# Patient Record
Sex: MALE | Race: WHITE | NOT HISPANIC OR LATINO | URBAN - METROPOLITAN AREA
[De-identification: names, ages, dates, MRNs, and addresses within clinical notes are randomized per-mention and may not be internally consistent; named-entity substitution may affect disease eponyms.]

---

## 2017-01-04 ENCOUNTER — EMERGENCY (EMERGENCY)
Facility: HOSPITAL | Age: 82
LOS: 1 days | Discharge: HOME | End: 2017-01-04
Attending: EMERGENCY MEDICINE | Admitting: EMERGENCY MEDICINE
Payer: MEDICARE

## 2017-01-04 VITALS
OXYGEN SATURATION: 97 % | DIASTOLIC BLOOD PRESSURE: 84 MMHG | SYSTOLIC BLOOD PRESSURE: 133 MMHG | RESPIRATION RATE: 16 BRPM | HEART RATE: 74 BPM

## 2017-01-04 VITALS
RESPIRATION RATE: 18 BRPM | TEMPERATURE: 98 F | HEART RATE: 75 BPM | SYSTOLIC BLOOD PRESSURE: 162 MMHG | DIASTOLIC BLOOD PRESSURE: 84 MMHG | OXYGEN SATURATION: 97 %

## 2017-01-04 DIAGNOSIS — I10 ESSENTIAL (PRIMARY) HYPERTENSION: ICD-10-CM

## 2017-01-04 DIAGNOSIS — M79.641 PAIN IN RIGHT HAND: ICD-10-CM

## 2017-01-04 DIAGNOSIS — R53.83 OTHER FATIGUE: ICD-10-CM

## 2017-01-04 DIAGNOSIS — Z90.49 ACQUIRED ABSENCE OF OTHER SPECIFIED PARTS OF DIGESTIVE TRACT: ICD-10-CM

## 2017-01-04 DIAGNOSIS — M79.1 MYALGIA: ICD-10-CM

## 2017-01-04 DIAGNOSIS — Z90.79 ACQUIRED ABSENCE OF OTHER GENITAL ORGAN(S): Chronic | ICD-10-CM

## 2017-01-04 DIAGNOSIS — N39.0 URINARY TRACT INFECTION, SITE NOT SPECIFIED: ICD-10-CM

## 2017-01-04 DIAGNOSIS — Z90.49 ACQUIRED ABSENCE OF OTHER SPECIFIED PARTS OF DIGESTIVE TRACT: Chronic | ICD-10-CM

## 2017-01-04 LAB
ALBUMIN SERPL ELPH-MCNC: 3.7 G/DL — SIGNIFICANT CHANGE UP (ref 3.3–5)
ALP SERPL-CCNC: 99 U/L — SIGNIFICANT CHANGE UP (ref 40–120)
ALT FLD-CCNC: 29 U/L RC — SIGNIFICANT CHANGE UP (ref 10–45)
ANION GAP SERPL CALC-SCNC: 13 MMOL/L — SIGNIFICANT CHANGE UP (ref 5–17)
APPEARANCE UR: ABNORMAL
AST SERPL-CCNC: 27 U/L — SIGNIFICANT CHANGE UP (ref 10–40)
BASOPHILS # BLD AUTO: 0 K/UL — SIGNIFICANT CHANGE UP (ref 0–0.2)
BASOPHILS NFR BLD AUTO: 0.1 % — SIGNIFICANT CHANGE UP (ref 0–2)
BILIRUB SERPL-MCNC: 0.5 MG/DL — SIGNIFICANT CHANGE UP (ref 0.2–1.2)
BILIRUB UR-MCNC: NEGATIVE — SIGNIFICANT CHANGE UP
BUN SERPL-MCNC: 12 MG/DL — SIGNIFICANT CHANGE UP (ref 7–23)
CALCIUM SERPL-MCNC: 9.4 MG/DL — SIGNIFICANT CHANGE UP (ref 8.4–10.5)
CHLORIDE SERPL-SCNC: 100 MMOL/L — SIGNIFICANT CHANGE UP (ref 96–108)
CO2 SERPL-SCNC: 33 MMOL/L — HIGH (ref 22–31)
COLOR SPEC: SIGNIFICANT CHANGE UP
CREAT SERPL-MCNC: 0.89 MG/DL — SIGNIFICANT CHANGE UP (ref 0.5–1.3)
DIFF PNL FLD: ABNORMAL
EOSINOPHIL # BLD AUTO: 0.1 K/UL — SIGNIFICANT CHANGE UP (ref 0–0.5)
EOSINOPHIL NFR BLD AUTO: 0.9 % — SIGNIFICANT CHANGE UP (ref 0–6)
GLUCOSE SERPL-MCNC: 139 MG/DL — HIGH (ref 70–99)
GLUCOSE UR QL: NEGATIVE — SIGNIFICANT CHANGE UP
HCT VFR BLD CALC: 40.9 % — SIGNIFICANT CHANGE UP (ref 39–50)
HGB BLD-MCNC: 13.2 G/DL — SIGNIFICANT CHANGE UP (ref 13–17)
KETONES UR-MCNC: NEGATIVE — SIGNIFICANT CHANGE UP
LEUKOCYTE ESTERASE UR-ACNC: ABNORMAL
LYMPHOCYTES # BLD AUTO: 1.3 K/UL — SIGNIFICANT CHANGE UP (ref 1–3.3)
LYMPHOCYTES # BLD AUTO: 9.7 % — LOW (ref 13–44)
MCHC RBC-ENTMCNC: 29.6 PG — SIGNIFICANT CHANGE UP (ref 27–34)
MCHC RBC-ENTMCNC: 32.2 GM/DL — SIGNIFICANT CHANGE UP (ref 32–36)
MCV RBC AUTO: 92.1 FL — SIGNIFICANT CHANGE UP (ref 80–100)
MONOCYTES # BLD AUTO: 0.6 K/UL — SIGNIFICANT CHANGE UP (ref 0–0.9)
MONOCYTES NFR BLD AUTO: 4.8 % — SIGNIFICANT CHANGE UP (ref 2–14)
NEUTROPHILS # BLD AUTO: 11.2 K/UL — HIGH (ref 1.8–7.4)
NEUTROPHILS NFR BLD AUTO: 84.6 % — HIGH (ref 43–77)
NITRITE UR-MCNC: NEGATIVE — SIGNIFICANT CHANGE UP
PH UR: 8 — SIGNIFICANT CHANGE UP (ref 4.8–8)
PLATELET # BLD AUTO: 366 K/UL — SIGNIFICANT CHANGE UP (ref 150–400)
POTASSIUM SERPL-MCNC: 4 MMOL/L — SIGNIFICANT CHANGE UP (ref 3.5–5.3)
POTASSIUM SERPL-SCNC: 4 MMOL/L — SIGNIFICANT CHANGE UP (ref 3.5–5.3)
PROT SERPL-MCNC: 7.5 G/DL — SIGNIFICANT CHANGE UP (ref 6–8.3)
PROT UR-MCNC: 30 MG/DL
RBC # BLD: 4.44 M/UL — SIGNIFICANT CHANGE UP (ref 4.2–5.8)
RBC # FLD: 13.4 % — SIGNIFICANT CHANGE UP (ref 10.3–14.5)
SODIUM SERPL-SCNC: 146 MMOL/L — HIGH (ref 135–145)
SP GR SPEC: 1.01 — SIGNIFICANT CHANGE UP (ref 1.01–1.02)
UROBILINOGEN FLD QL: NEGATIVE — SIGNIFICANT CHANGE UP
WBC # BLD: 13.2 K/UL — HIGH (ref 3.8–10.5)
WBC # FLD AUTO: 13.2 K/UL — HIGH (ref 3.8–10.5)
WBC UR QL: >50 /HPF (ref 0–5)

## 2017-01-04 PROCEDURE — 70450 CT HEAD/BRAIN W/O DYE: CPT | Mod: 26

## 2017-01-04 PROCEDURE — 87086 URINE CULTURE/COLONY COUNT: CPT

## 2017-01-04 PROCEDURE — 80053 COMPREHEN METABOLIC PANEL: CPT

## 2017-01-04 PROCEDURE — 99284 EMERGENCY DEPT VISIT MOD MDM: CPT | Mod: GC

## 2017-01-04 PROCEDURE — 82962 GLUCOSE BLOOD TEST: CPT

## 2017-01-04 PROCEDURE — 99284 EMERGENCY DEPT VISIT MOD MDM: CPT | Mod: 25

## 2017-01-04 PROCEDURE — 96372 THER/PROPH/DIAG INJ SC/IM: CPT

## 2017-01-04 PROCEDURE — 70450 CT HEAD/BRAIN W/O DYE: CPT

## 2017-01-04 PROCEDURE — 81001 URINALYSIS AUTO W/SCOPE: CPT

## 2017-01-04 PROCEDURE — 85027 COMPLETE CBC AUTOMATED: CPT

## 2017-01-04 PROCEDURE — 73130 X-RAY EXAM OF HAND: CPT | Mod: 26,50

## 2017-01-04 PROCEDURE — 87186 SC STD MICRODIL/AGAR DIL: CPT

## 2017-01-04 PROCEDURE — 73130 X-RAY EXAM OF HAND: CPT

## 2017-01-04 RX ORDER — SODIUM CHLORIDE 9 MG/ML
1000 INJECTION INTRAMUSCULAR; INTRAVENOUS; SUBCUTANEOUS
Qty: 0 | Refills: 0 | Status: DISCONTINUED | OUTPATIENT
Start: 2017-01-04 | End: 2017-01-08

## 2017-01-04 RX ORDER — OLANZAPINE 15 MG/1
7.5 TABLET, FILM COATED ORAL ONCE
Qty: 0 | Refills: 0 | Status: COMPLETED | OUTPATIENT
Start: 2017-01-04 | End: 2017-01-04

## 2017-01-04 RX ORDER — AZTREONAM 2 G
1 VIAL (EA) INJECTION
Qty: 20 | Refills: 0 | OUTPATIENT
Start: 2017-01-04 | End: 2017-01-14

## 2017-01-04 RX ORDER — OLANZAPINE 15 MG/1
5 TABLET, FILM COATED ORAL ONCE
Qty: 0 | Refills: 0 | Status: DISCONTINUED | OUTPATIENT
Start: 2017-01-04 | End: 2017-01-04

## 2017-01-04 RX ORDER — SODIUM CHLORIDE 9 MG/ML
1000 INJECTION INTRAMUSCULAR; INTRAVENOUS; SUBCUTANEOUS ONCE
Qty: 0 | Refills: 0 | Status: COMPLETED | OUTPATIENT
Start: 2017-01-04 | End: 2017-01-04

## 2017-01-04 RX ORDER — CEFTRIAXONE 500 MG/1
1 INJECTION, POWDER, FOR SOLUTION INTRAMUSCULAR; INTRAVENOUS ONCE
Qty: 0 | Refills: 0 | Status: DISCONTINUED | OUTPATIENT
Start: 2017-01-04 | End: 2017-01-04

## 2017-01-04 RX ADMIN — OLANZAPINE 7.5 MILLIGRAM(S): 15 TABLET, FILM COATED ORAL at 17:26

## 2017-01-04 RX ADMIN — SODIUM CHLORIDE 1000 MILLILITER(S): 9 INJECTION INTRAMUSCULAR; INTRAVENOUS; SUBCUTANEOUS at 17:24

## 2017-01-04 RX ADMIN — SODIUM CHLORIDE 75 MILLILITER(S): 9 INJECTION INTRAMUSCULAR; INTRAVENOUS; SUBCUTANEOUS at 20:21

## 2017-01-04 RX ADMIN — Medication 1 TABLET(S): at 20:20

## 2017-01-04 NOTE — ED PROVIDER NOTE - SHIFT CHANGE DETAILS
Will follow up on labs, analgesia, any clinical imaging, reassess and disposition as clinically indicated.  Details of patient and plan conveyed to receiving physician and conveyed back for understanding.  There were no questions at this time about the patients disposition and plan. Patient's care to be taken over by receiving physician at this time, all decisions regarding the progression of care will be made at their discretion.

## 2017-01-04 NOTE — ED PROVIDER NOTE - MEDICAL DECISION MAKING DETAILS
86 year old male patient with hand swelling and urinary obstruction concerned from family and [facility] nursing home. Will remove Barr catheter and insert new Barr. Will give slight fluids, CT head. Per son, might have to give 7.5mg Zyprexa to x-ray bilateral hands.

## 2017-01-04 NOTE — ED PROVIDER NOTE - DETAILS:
Cruz Houser MD note: The scribe's documentation has been prepared under my direction and personally reviewed by me.  I confirm that the note above accurately reflects my work, treatment, procedures, and medical decision making.

## 2017-01-04 NOTE — ED PROVIDER NOTE - CONDUCTED A DETAILED DISCUSSION WITH PATIENT AND/OR GUARDIAN REGARDING, MDM
return to ED if symptoms worsen, persist or questions arise/radiology results/lab results/need for outpatient follow-up

## 2017-01-04 NOTE — ED ADULT NURSE NOTE - OBJECTIVE STATEMENT
1210 86 yr old WM brought to ER via ambulance on stretcher for further eval and tx of Leaking carlson. Bag was changed yesterday per EMS. Bed was saturated with urine per home health aide this morning, but no urine in drainage bag. awake. Eyes opened. confused. non verbal 1210 86 yr old WM brought to ER via ambulance on stretcher for further eval and tx of Leaking carlson. Bag was changed yesterday per EMS. Bed was saturated with urine per home health aide this morning, but no urine in drainage bag. awake. Eyes opened. confused. non verbal. wearing diaper. Carlson noted with no drainage. Deflated without difficulty and removed

## 2017-01-04 NOTE — ED PROVIDER NOTE - OBJECTIVE STATEMENT
86 year old male patient with pmhx of HTN, PAF, dementia, and gout presents to ED for pulled Barr catheter and increased lethargy as per phone call from son, Dr. Sotelo. Son would like Barr replaced and to x-ray both hands due to concern for bilateral swelling of hands. If necessary, will give patient 5-7.5mg of Zyprexa. 86 year old male patient with pmhx of HTN, PAF, dementia, and gout presents to ED for pulled Barr catheter/malfunction at this time and increased lethargy as per phone call from son, Dr. Sotelo. Son would like Barr replaced and to x-ray both hands due to concern for bilateral swelling of hands. If necessary, will give patient 5-7.5mg of Zyprexa for study per son.

## 2017-01-04 NOTE — ED ADULT TRIAGE NOTE - CHIEF COMPLAINT QUOTE
carlson is not working correctly.  carlson bag was changed yesterday and pt woke up this morning saturated with urine.  Pt complaining of b/l hand pain.  son requesting that hands be xrayed. no injury reported to hands

## 2017-01-04 NOTE — ED PROVIDER NOTE - PROGRESS NOTE DETAILS
Reassessment. Patient is now opening eyes and awake. Waiting for x-ray of hands. Spoke to Dr. Sotelo. Will give bactrim for urine. If culture is resistant to bactrim, patient will report back to ED for admission. BILLY: awaiting xray results.

## 2017-01-04 NOTE — ED PROVIDER NOTE - PMH
Afib    Alzheimer disease    BPH (benign prostatic hypertrophy) with urinary retention    Glaucoma    HTN (hypertension)

## 2017-01-04 NOTE — ED PROVIDER NOTE - CARE PLAN
Principal Discharge DX:	UTI (urinary tract infection) Principal Discharge DX:	UTI (urinary tract infection)  Goal:	hand swelling bilaterally, traumatic, subsequent encounter

## 2017-01-04 NOTE — ED PROVIDER NOTE - NS ED MD SCRIBE ATTENDING SCRIBE SECTIONS
VITAL SIGNS( Pullset)/HIV/DISPOSITION/HISTORY OF PRESENT ILLNESS/PHYSICAL EXAM/REVIEW OF SYSTEMS/PAST MEDICAL/SURGICAL/SOCIAL HISTORY

## 2017-01-04 NOTE — ED PROVIDER NOTE - PHYSICAL EXAMINATION
Eyes closed and will not open eyes to commands, Not answering questions, Dry and cracked lips and dry mucous membranes, Trachea midline, CTAB and no crackles, Non-tachycardic, Normal perfusion, soft with no elicited tenderness, NTND, No edema, No deformity of extremities, Not alert, Uncooperative with exam, Slightly combative, No rashes noted, No petechiae, No vesicles, Left hand with erythema over 2nd to 5th MCPs and mildly swollen, Right hand mildly swollen over 1st and 2nd MCP, Barr in place draining yellow urine with sediment.

## 2017-01-06 ENCOUNTER — APPOINTMENT (OUTPATIENT)
Dept: HOME HEALTH SERVICES | Facility: HOME HEALTH | Age: 82
End: 2017-01-06

## 2017-01-06 VITALS
SYSTOLIC BLOOD PRESSURE: 170 MMHG | DIASTOLIC BLOOD PRESSURE: 90 MMHG | HEART RATE: 72 BPM | OXYGEN SATURATION: 97 % | RESPIRATION RATE: 16 BRPM

## 2017-01-06 DIAGNOSIS — R19.7 DIARRHEA, UNSPECIFIED: ICD-10-CM

## 2017-01-06 DIAGNOSIS — A04.7 ENTEROCOLITIS DUE TO CLOSTRIDIUM DIFFICILE: ICD-10-CM

## 2017-01-06 LAB
-  AMIKACIN: SIGNIFICANT CHANGE UP
-  AMPICILLIN/SULBACTAM: SIGNIFICANT CHANGE UP
-  AMPICILLIN: SIGNIFICANT CHANGE UP
-  AZTREONAM: SIGNIFICANT CHANGE UP
-  CEFAZOLIN: SIGNIFICANT CHANGE UP
-  CEFEPIME: SIGNIFICANT CHANGE UP
-  CEFOXITIN: SIGNIFICANT CHANGE UP
-  CEFTAZIDIME: SIGNIFICANT CHANGE UP
-  CEFTRIAXONE: SIGNIFICANT CHANGE UP
-  CIPROFLOXACIN: SIGNIFICANT CHANGE UP
-  ERTAPENEM: SIGNIFICANT CHANGE UP
-  GENTAMICIN: SIGNIFICANT CHANGE UP
-  LEVOFLOXACIN: SIGNIFICANT CHANGE UP
-  MEROPENEM: SIGNIFICANT CHANGE UP
-  NITROFURANTOIN: SIGNIFICANT CHANGE UP
-  PIPERACILLIN/TAZOBACTAM: SIGNIFICANT CHANGE UP
-  TOBRAMYCIN: SIGNIFICANT CHANGE UP
-  TRIMETHOPRIM/SULFAMETHOXAZOLE: SIGNIFICANT CHANGE UP
CULTURE RESULTS: SIGNIFICANT CHANGE UP
METHOD TYPE: SIGNIFICANT CHANGE UP
ORGANISM # SPEC MICROSCOPIC CNT: SIGNIFICANT CHANGE UP
ORGANISM # SPEC MICROSCOPIC CNT: SIGNIFICANT CHANGE UP
SPECIMEN SOURCE: SIGNIFICANT CHANGE UP

## 2017-01-27 ENCOUNTER — APPOINTMENT (OUTPATIENT)
Dept: HOME HEALTH SERVICES | Facility: HOME HEALTH | Age: 82
End: 2017-01-27

## 2017-01-27 VITALS
SYSTOLIC BLOOD PRESSURE: 135 MMHG | HEART RATE: 60 BPM | DIASTOLIC BLOOD PRESSURE: 66 MMHG | TEMPERATURE: 97 F | RESPIRATION RATE: 16 BRPM | OXYGEN SATURATION: 96 %

## 2017-02-06 ENCOUNTER — APPOINTMENT (OUTPATIENT)
Dept: HOME HEALTH SERVICES | Facility: HOME HEALTH | Age: 82
End: 2017-02-06

## 2017-02-06 DIAGNOSIS — N39.0 URINARY TRACT INFECTION, SITE NOT SPECIFIED: ICD-10-CM

## 2017-02-07 VITALS — SYSTOLIC BLOOD PRESSURE: 120 MMHG | DIASTOLIC BLOOD PRESSURE: 80 MMHG | HEART RATE: 70 BPM | RESPIRATION RATE: 16 BRPM

## 2017-02-13 ENCOUNTER — CHART COPY (OUTPATIENT)
Age: 82
End: 2017-02-13

## 2017-02-17 ENCOUNTER — APPOINTMENT (OUTPATIENT)
Dept: HOME HEALTH SERVICES | Facility: HOME HEALTH | Age: 82
End: 2017-02-17

## 2017-02-22 ENCOUNTER — APPOINTMENT (OUTPATIENT)
Dept: HOME HEALTH SERVICES | Facility: HOME HEALTH | Age: 82
End: 2017-02-22

## 2017-02-22 VITALS
RESPIRATION RATE: 16 BRPM | HEART RATE: 88 BPM | TEMPERATURE: 98 F | SYSTOLIC BLOOD PRESSURE: 110 MMHG | DIASTOLIC BLOOD PRESSURE: 68 MMHG

## 2017-03-08 ENCOUNTER — APPOINTMENT (OUTPATIENT)
Dept: HOME HEALTH SERVICES | Facility: HOME HEALTH | Age: 82
End: 2017-03-08

## 2017-03-28 ENCOUNTER — APPOINTMENT (OUTPATIENT)
Dept: HOME HEALTH SERVICES | Facility: HOME HEALTH | Age: 82
End: 2017-03-28

## 2017-03-28 VITALS — HEART RATE: 80 BPM | RESPIRATION RATE: 16 BRPM | DIASTOLIC BLOOD PRESSURE: 70 MMHG | SYSTOLIC BLOOD PRESSURE: 120 MMHG

## 2017-03-28 DIAGNOSIS — N13.8 BENIGN PROSTATIC HYPERPLASIA WITH LOWER URINARY TRACT SYMPMS: ICD-10-CM

## 2017-03-28 DIAGNOSIS — N40.1 BENIGN PROSTATIC HYPERPLASIA WITH LOWER URINARY TRACT SYMPMS: ICD-10-CM

## 2017-04-16 ENCOUNTER — INPATIENT (INPATIENT)
Facility: HOSPITAL | Age: 82
LOS: 14 days | Discharge: HOME | DRG: 872 | End: 2017-05-01
Attending: HOSPITALIST | Admitting: HOSPITALIST
Payer: MEDICARE

## 2017-04-16 VITALS
DIASTOLIC BLOOD PRESSURE: 53 MMHG | TEMPERATURE: 103 F | SYSTOLIC BLOOD PRESSURE: 110 MMHG | RESPIRATION RATE: 18 BRPM | OXYGEN SATURATION: 99 % | HEART RATE: 104 BPM

## 2017-04-16 DIAGNOSIS — Z90.79 ACQUIRED ABSENCE OF OTHER GENITAL ORGAN(S): Chronic | ICD-10-CM

## 2017-04-16 DIAGNOSIS — Z92.89 PERSONAL HISTORY OF OTHER MEDICAL TREATMENT: ICD-10-CM

## 2017-04-16 DIAGNOSIS — A41.9 SEPSIS, UNSPECIFIED ORGANISM: ICD-10-CM

## 2017-04-16 DIAGNOSIS — Z90.49 ACQUIRED ABSENCE OF OTHER SPECIFIED PARTS OF DIGESTIVE TRACT: Chronic | ICD-10-CM

## 2017-04-16 LAB
ALBUMIN SERPL ELPH-MCNC: 3.1 G/DL — LOW (ref 3.3–5)
ALP SERPL-CCNC: 128 U/L — HIGH (ref 40–120)
ALT FLD-CCNC: 17 U/L RC — SIGNIFICANT CHANGE UP (ref 10–45)
ANION GAP SERPL CALC-SCNC: 11 MMOL/L — SIGNIFICANT CHANGE UP (ref 5–17)
APPEARANCE UR: ABNORMAL
APTT BLD: 23 SEC — LOW (ref 27.5–37.4)
AST SERPL-CCNC: 35 U/L — SIGNIFICANT CHANGE UP (ref 10–40)
BACTERIA # UR AUTO: ABNORMAL /HPF
BASOPHILS # BLD AUTO: 0 K/UL — SIGNIFICANT CHANGE UP (ref 0–0.2)
BILIRUB SERPL-MCNC: 0.3 MG/DL — SIGNIFICANT CHANGE UP (ref 0.2–1.2)
BILIRUB UR-MCNC: NEGATIVE — SIGNIFICANT CHANGE UP
BUN SERPL-MCNC: 25 MG/DL — HIGH (ref 7–23)
CALCIUM SERPL-MCNC: 8.2 MG/DL — LOW (ref 8.4–10.5)
CHLORIDE SERPL-SCNC: 103 MMOL/L — SIGNIFICANT CHANGE UP (ref 96–108)
CO2 SERPL-SCNC: 31 MMOL/L — SIGNIFICANT CHANGE UP (ref 22–31)
COLOR SPEC: YELLOW — SIGNIFICANT CHANGE UP
CREAT SERPL-MCNC: 0.91 MG/DL — SIGNIFICANT CHANGE UP (ref 0.5–1.3)
DIFF PNL FLD: ABNORMAL
EOSINOPHIL # BLD AUTO: 0 K/UL — SIGNIFICANT CHANGE UP (ref 0–0.5)
EPI CELLS # UR: SIGNIFICANT CHANGE UP /HPF
GAS PNL BLDV: SIGNIFICANT CHANGE UP
GAS PNL BLDV: SIGNIFICANT CHANGE UP
GLUCOSE SERPL-MCNC: 115 MG/DL — HIGH (ref 70–99)
GLUCOSE UR QL: NEGATIVE — SIGNIFICANT CHANGE UP
HCT VFR BLD CALC: 24.7 % — LOW (ref 39–50)
HGB BLD-MCNC: 8.4 G/DL — LOW (ref 13–17)
INR BLD: 1.31 RATIO — HIGH (ref 0.88–1.16)
KETONES UR-MCNC: NEGATIVE — SIGNIFICANT CHANGE UP
LEUKOCYTE ESTERASE UR-ACNC: ABNORMAL
LYMPHOCYTES # BLD AUTO: 0.2 K/UL — LOW (ref 1–3.3)
LYMPHOCYTES # BLD AUTO: 1 % — LOW (ref 13–44)
MCHC RBC-ENTMCNC: 31.5 PG — SIGNIFICANT CHANGE UP (ref 27–34)
MCHC RBC-ENTMCNC: 34 GM/DL — SIGNIFICANT CHANGE UP (ref 32–36)
MCV RBC AUTO: 92.7 FL — SIGNIFICANT CHANGE UP (ref 80–100)
MONOCYTES # BLD AUTO: 0.2 K/UL — SIGNIFICANT CHANGE UP (ref 0–0.9)
NEUTROPHILS # BLD AUTO: 13.5 K/UL — HIGH (ref 1.8–7.4)
NEUTROPHILS NFR BLD AUTO: 94 % — HIGH (ref 43–77)
NITRITE UR-MCNC: NEGATIVE — SIGNIFICANT CHANGE UP
PH UR: 8 — SIGNIFICANT CHANGE UP (ref 4.8–8)
PLATELET # BLD AUTO: 192 K/UL — SIGNIFICANT CHANGE UP (ref 150–400)
POTASSIUM SERPL-MCNC: 3.1 MMOL/L — LOW (ref 3.5–5.3)
POTASSIUM SERPL-SCNC: 3.1 MMOL/L — LOW (ref 3.5–5.3)
PROT SERPL-MCNC: 6.1 G/DL — SIGNIFICANT CHANGE UP (ref 6–8.3)
PROT UR-MCNC: 100 MG/DL
PROTHROM AB SERPL-ACNC: 14.4 SEC — HIGH (ref 9.8–12.7)
RBC # BLD: 2.67 M/UL — LOW (ref 4.2–5.8)
RBC # FLD: 13.3 % — SIGNIFICANT CHANGE UP (ref 10.3–14.5)
RBC CASTS # UR COMP ASSIST: >50 /HPF (ref 0–2)
SODIUM SERPL-SCNC: 145 MMOL/L — SIGNIFICANT CHANGE UP (ref 135–145)
SP GR SPEC: 1.01 — SIGNIFICANT CHANGE UP (ref 1.01–1.02)
UROBILINOGEN FLD QL: NEGATIVE — SIGNIFICANT CHANGE UP
WBC # BLD: 13.9 K/UL — HIGH (ref 3.8–10.5)
WBC # FLD AUTO: 13.9 K/UL — HIGH (ref 3.8–10.5)
WBC UR QL: >50 /HPF (ref 0–5)

## 2017-04-16 PROCEDURE — 93010 ELECTROCARDIOGRAM REPORT: CPT

## 2017-04-16 PROCEDURE — 99285 EMERGENCY DEPT VISIT HI MDM: CPT | Mod: 25,GC

## 2017-04-16 PROCEDURE — 71010: CPT | Mod: 26

## 2017-04-16 RX ORDER — POTASSIUM CHLORIDE 20 MEQ
10 PACKET (EA) ORAL ONCE
Qty: 0 | Refills: 0 | Status: DISCONTINUED | OUTPATIENT
Start: 2017-04-16 | End: 2017-04-16

## 2017-04-16 RX ORDER — MEROPENEM 1 G/30ML
1000 INJECTION INTRAVENOUS ONCE
Qty: 0 | Refills: 0 | Status: COMPLETED | OUTPATIENT
Start: 2017-04-16 | End: 2017-04-16

## 2017-04-16 RX ORDER — SODIUM CHLORIDE 9 MG/ML
500 INJECTION INTRAMUSCULAR; INTRAVENOUS; SUBCUTANEOUS
Qty: 0 | Refills: 0 | Status: COMPLETED | OUTPATIENT
Start: 2017-04-16 | End: 2017-04-16

## 2017-04-16 RX ORDER — TIMOLOL 0.5 %
1 DROPS OPHTHALMIC (EYE)
Qty: 0 | Refills: 0 | COMMUNITY

## 2017-04-16 RX ORDER — POTASSIUM CHLORIDE 20 MEQ
10 PACKET (EA) ORAL
Qty: 0 | Refills: 0 | Status: COMPLETED | OUTPATIENT
Start: 2017-04-16 | End: 2017-04-16

## 2017-04-16 RX ORDER — ACETAMINOPHEN 500 MG
1000 TABLET ORAL ONCE
Qty: 0 | Refills: 0 | Status: COMPLETED | OUTPATIENT
Start: 2017-04-16 | End: 2017-04-16

## 2017-04-16 RX ORDER — SODIUM CHLORIDE 9 MG/ML
3 INJECTION INTRAMUSCULAR; INTRAVENOUS; SUBCUTANEOUS ONCE
Qty: 0 | Refills: 0 | Status: COMPLETED | OUTPATIENT
Start: 2017-04-16 | End: 2017-04-16

## 2017-04-16 RX ADMIN — SODIUM CHLORIDE 2000 MILLILITER(S): 9 INJECTION INTRAMUSCULAR; INTRAVENOUS; SUBCUTANEOUS at 20:45

## 2017-04-16 RX ADMIN — Medication 400 MILLIGRAM(S): at 20:14

## 2017-04-16 RX ADMIN — Medication 100 MILLIEQUIVALENT(S): at 21:56

## 2017-04-16 RX ADMIN — SODIUM CHLORIDE 2000 MILLILITER(S): 9 INJECTION INTRAMUSCULAR; INTRAVENOUS; SUBCUTANEOUS at 21:44

## 2017-04-16 RX ADMIN — Medication 100 MILLIEQUIVALENT(S): at 23:10

## 2017-04-16 RX ADMIN — Medication 1000 MILLIGRAM(S): at 20:44

## 2017-04-16 RX ADMIN — MEROPENEM 200 MILLIGRAM(S): 1 INJECTION INTRAVENOUS at 20:41

## 2017-04-16 RX ADMIN — SODIUM CHLORIDE 3 MILLILITER(S): 9 INJECTION INTRAMUSCULAR; INTRAVENOUS; SUBCUTANEOUS at 20:14

## 2017-04-16 NOTE — ED PROVIDER NOTE - MEDICAL DECISION MAKING DETAILS
sepsis w/u, likely urinary source, change carlson. correlate Abx with past cultures. fluids. vbg. CXR. TBA

## 2017-04-16 NOTE — ED PROVIDER NOTE - CARE PLAN
Principal Discharge DX:	Sepsis, due to unspecified organism  Secondary Diagnosis:	Acute cystitis without hematuria  Secondary Diagnosis:	Anemia, unspecified type

## 2017-04-16 NOTE — ED ADULT NURSE NOTE - OBJECTIVE STATEMENT
85 yo male presents to the ED from home vis EMS c/o fever, tremors tonight. as per son, patient urine has been cloudy x2 days and patient began tremors today. patient is AAOx1, disoriented to place, time, situation as baseline as per son. patient lung sounds clear, cap refill <3sec. patient arrives to ED with carlson catheter in place x 30days draining dark cloudy yellow urine, 500cc. Patient skin intact and shows nonverbal signs of pain. patient has fever 102.6 rectally. VSS. MD at the bedside. no tremors present in ED.

## 2017-04-16 NOTE — ED PROVIDER NOTE - ATTENDING CONTRIBUTION TO CARE
85yo M with advanced dementia, BIBEMS from home with family member, tonight with rigors/fever presenting with possible uroseptic noted, ivf, abx with h/o mdr Ecoli on mulitple visits. aimee roque nt.

## 2017-04-16 NOTE — ED PROVIDER NOTE - OBJECTIVE STATEMENT
87yo M with advanced dementia, BIBEMS from home with family member, tonight with rigors/fever. 1 episode of vomiting, chronic indwelling carlson with h/o MDR organisms. normal PO intake. MS at baseline. no cough. no SOB. no diarrhea. no rash. no sick contacts. Carlson changes w/in last month, +cloudy urine.     PCP- visiting

## 2017-04-16 NOTE — ED PROVIDER NOTE - PHYSICAL EXAMINATION
Gen: chronically ill, ill appearing, laying with eyes closed, not following commands  Head: NCAT  HEENT: PERRL, oral mucosa dry, normal conjunctiva, neck supple  Lung: CTAB, mild tachypnea  CV: tachy regular, no murmur, Normal perfusion  Abd: soft, NTND  : chronic indwelling carlson with tract formation  MSK: No edema, no visible deformities  Neuro: No focal neurologic deficits  Skin: No rash   Psych: normal affect

## 2017-04-16 NOTE — ED PROVIDER NOTE - PROGRESS NOTE DETAILS
brown stool, guaiac negative, unclear etiology of anemia new from january 2017. no obvious source of bleeding. -Zachary SAGASTUME patient stable, spoke with son regarding results, waiting for UA to admit -Zachary DO

## 2017-04-17 ENCOUNTER — APPOINTMENT (OUTPATIENT)
Dept: HOME HEALTH SERVICES | Facility: HOME HEALTH | Age: 82
End: 2017-04-17

## 2017-04-17 DIAGNOSIS — R63.8 OTHER SYMPTOMS AND SIGNS CONCERNING FOOD AND FLUID INTAKE: ICD-10-CM

## 2017-04-17 DIAGNOSIS — N39.0 URINARY TRACT INFECTION, SITE NOT SPECIFIED: ICD-10-CM

## 2017-04-17 DIAGNOSIS — I10 ESSENTIAL (PRIMARY) HYPERTENSION: ICD-10-CM

## 2017-04-17 DIAGNOSIS — Z71.89 OTHER SPECIFIED COUNSELING: ICD-10-CM

## 2017-04-17 DIAGNOSIS — N40.1 BENIGN PROSTATIC HYPERPLASIA WITH LOWER URINARY TRACT SYMPTOMS: ICD-10-CM

## 2017-04-17 DIAGNOSIS — Z41.8 ENCOUNTER FOR OTHER PROCEDURES FOR PURPOSES OTHER THAN REMEDYING HEALTH STATE: ICD-10-CM

## 2017-04-17 DIAGNOSIS — A41.9 SEPSIS, UNSPECIFIED ORGANISM: ICD-10-CM

## 2017-04-17 DIAGNOSIS — G30.9 ALZHEIMER'S DISEASE, UNSPECIFIED: ICD-10-CM

## 2017-04-17 DIAGNOSIS — D64.9 ANEMIA, UNSPECIFIED: ICD-10-CM

## 2017-04-17 LAB
ANION GAP SERPL CALC-SCNC: 8 MMOL/L — SIGNIFICANT CHANGE UP (ref 5–17)
BASOPHILS # BLD AUTO: 0 K/UL — SIGNIFICANT CHANGE UP (ref 0–0.2)
BASOPHILS NFR BLD AUTO: 0 % — SIGNIFICANT CHANGE UP (ref 0–2)
BUN SERPL-MCNC: 20 MG/DL — SIGNIFICANT CHANGE UP (ref 7–23)
CALCIUM SERPL-MCNC: 8.2 MG/DL — LOW (ref 8.4–10.5)
CHLORIDE SERPL-SCNC: 109 MMOL/L — HIGH (ref 96–108)
CO2 SERPL-SCNC: 31 MMOL/L — SIGNIFICANT CHANGE UP (ref 22–31)
CREAT SERPL-MCNC: 0.88 MG/DL — SIGNIFICANT CHANGE UP (ref 0.5–1.3)
EOSINOPHIL # BLD AUTO: 0.1 K/UL — SIGNIFICANT CHANGE UP (ref 0–0.5)
EOSINOPHIL NFR BLD AUTO: 0 % — SIGNIFICANT CHANGE UP (ref 0–6)
FERRITIN SERPL-MCNC: 115.7 NG/ML — SIGNIFICANT CHANGE UP (ref 30–400)
FOLATE SERPL-MCNC: 7.2 NG/ML — SIGNIFICANT CHANGE UP (ref 4.8–24.2)
GLUCOSE SERPL-MCNC: 113 MG/DL — HIGH (ref 70–99)
GRAM STN FLD: SIGNIFICANT CHANGE UP
GRAM STN FLD: SIGNIFICANT CHANGE UP
HCT VFR BLD CALC: 34.4 % — LOW (ref 39–50)
HGB BLD-MCNC: 11 G/DL — LOW (ref 13–17)
IRON SATN MFR SERPL: 10 UG/DL — LOW (ref 45–165)
IRON SATN MFR SERPL: 5 % — LOW (ref 16–55)
LYMPHOCYTES # BLD AUTO: 0.7 K/UL — LOW (ref 1–3.3)
LYMPHOCYTES # BLD AUTO: 5 % — LOW (ref 13–44)
MCHC RBC-ENTMCNC: 30 PG — SIGNIFICANT CHANGE UP (ref 27–34)
MCHC RBC-ENTMCNC: 31.9 GM/DL — LOW (ref 32–36)
MCV RBC AUTO: 94 FL — SIGNIFICANT CHANGE UP (ref 80–100)
MONOCYTES # BLD AUTO: 0.8 K/UL — SIGNIFICANT CHANGE UP (ref 0–0.9)
MONOCYTES NFR BLD AUTO: 3 % — SIGNIFICANT CHANGE UP (ref 2–14)
NEUTROPHILS # BLD AUTO: 21.1 K/UL — HIGH (ref 1.8–7.4)
NEUTROPHILS NFR BLD AUTO: 83 % — HIGH (ref 43–77)
OB PNL STL: NEGATIVE — SIGNIFICANT CHANGE UP
PLATELET # BLD AUTO: 213 K/UL — SIGNIFICANT CHANGE UP (ref 150–400)
POTASSIUM SERPL-MCNC: 4.2 MMOL/L — SIGNIFICANT CHANGE UP (ref 3.5–5.3)
POTASSIUM SERPL-SCNC: 4.2 MMOL/L — SIGNIFICANT CHANGE UP (ref 3.5–5.3)
RBC # BLD: 3.66 M/UL — LOW (ref 4.2–5.8)
RBC # BLD: 3.66 M/UL — LOW (ref 4.2–5.8)
RBC # FLD: 13.5 % — SIGNIFICANT CHANGE UP (ref 10.3–14.5)
RETICS #: 59.1 K/UL — SIGNIFICANT CHANGE UP (ref 25–125)
RETICS/RBC NFR: 1.6 % — SIGNIFICANT CHANGE UP (ref 0.5–2.5)
SODIUM SERPL-SCNC: 148 MMOL/L — HIGH (ref 135–145)
SPECIMEN SOURCE: SIGNIFICANT CHANGE UP
TIBC SERPL-MCNC: 190 UG/DL — LOW (ref 220–430)
UIBC SERPL-MCNC: 180 UG/DL — SIGNIFICANT CHANGE UP (ref 110–370)
VIT B12 SERPL-MCNC: 1093 PG/ML — HIGH (ref 243–894)
WBC # BLD: 22.6 K/UL — HIGH (ref 3.8–10.5)
WBC # FLD AUTO: 22.6 K/UL — HIGH (ref 3.8–10.5)

## 2017-04-17 PROCEDURE — 99222 1ST HOSP IP/OBS MODERATE 55: CPT

## 2017-04-17 PROCEDURE — 99497 ADVNCD CARE PLAN 30 MIN: CPT | Mod: 25

## 2017-04-17 PROCEDURE — 99223 1ST HOSP IP/OBS HIGH 75: CPT | Mod: GC

## 2017-04-17 RX ORDER — SODIUM CHLORIDE 9 MG/ML
1000 INJECTION INTRAMUSCULAR; INTRAVENOUS; SUBCUTANEOUS
Qty: 0 | Refills: 0 | Status: DISCONTINUED | OUTPATIENT
Start: 2017-04-17 | End: 2017-04-18

## 2017-04-17 RX ORDER — ACETAMINOPHEN 500 MG
650 TABLET ORAL EVERY 6 HOURS
Qty: 0 | Refills: 0 | Status: DISCONTINUED | OUTPATIENT
Start: 2017-04-17 | End: 2017-05-01

## 2017-04-17 RX ORDER — LATANOPROST 0.05 MG/ML
1 SOLUTION/ DROPS OPHTHALMIC; TOPICAL AT BEDTIME
Qty: 0 | Refills: 0 | Status: DISCONTINUED | OUTPATIENT
Start: 2017-04-17 | End: 2017-05-01

## 2017-04-17 RX ORDER — OLANZAPINE 15 MG/1
5 TABLET, FILM COATED ORAL AT BEDTIME
Qty: 0 | Refills: 0 | Status: DISCONTINUED | OUTPATIENT
Start: 2017-04-17 | End: 2017-04-22

## 2017-04-17 RX ORDER — DONEPEZIL HYDROCHLORIDE 10 MG/1
10 TABLET, FILM COATED ORAL AT BEDTIME
Qty: 0 | Refills: 0 | Status: DISCONTINUED | OUTPATIENT
Start: 2017-04-17 | End: 2017-04-21

## 2017-04-17 RX ORDER — TIMOLOL 0.5 %
1 DROPS OPHTHALMIC (EYE)
Qty: 0 | Refills: 0 | Status: DISCONTINUED | OUTPATIENT
Start: 2017-04-17 | End: 2017-04-21

## 2017-04-17 RX ORDER — SODIUM CHLORIDE 9 MG/ML
1000 INJECTION INTRAMUSCULAR; INTRAVENOUS; SUBCUTANEOUS
Qty: 0 | Refills: 0 | Status: DISCONTINUED | OUTPATIENT
Start: 2017-04-17 | End: 2017-04-17

## 2017-04-17 RX ORDER — MEMANTINE HYDROCHLORIDE 10 MG/1
10 TABLET ORAL
Qty: 0 | Refills: 0 | Status: DISCONTINUED | OUTPATIENT
Start: 2017-04-17 | End: 2017-05-01

## 2017-04-17 RX ORDER — AMLODIPINE BESYLATE 2.5 MG/1
1 TABLET ORAL
Qty: 0 | Refills: 0 | COMMUNITY

## 2017-04-17 RX ORDER — MEROPENEM 1 G/30ML
1000 INJECTION INTRAVENOUS EVERY 12 HOURS
Qty: 0 | Refills: 0 | Status: DISCONTINUED | OUTPATIENT
Start: 2017-04-17 | End: 2017-04-19

## 2017-04-17 RX ORDER — TAMSULOSIN HYDROCHLORIDE 0.4 MG/1
0.4 CAPSULE ORAL AT BEDTIME
Qty: 0 | Refills: 0 | Status: DISCONTINUED | OUTPATIENT
Start: 2017-04-17 | End: 2017-04-20

## 2017-04-17 RX ADMIN — TAMSULOSIN HYDROCHLORIDE 0.4 MILLIGRAM(S): 0.4 CAPSULE ORAL at 20:56

## 2017-04-17 RX ADMIN — Medication 1 DROP(S): at 17:55

## 2017-04-17 RX ADMIN — MEROPENEM 200 MILLIGRAM(S): 1 INJECTION INTRAVENOUS at 08:23

## 2017-04-17 RX ADMIN — MEMANTINE HYDROCHLORIDE 10 MILLIGRAM(S): 10 TABLET ORAL at 17:55

## 2017-04-17 RX ADMIN — LATANOPROST 1 DROP(S): 0.05 SOLUTION/ DROPS OPHTHALMIC; TOPICAL at 20:58

## 2017-04-17 RX ADMIN — SODIUM CHLORIDE 75 MILLILITER(S): 9 INJECTION INTRAMUSCULAR; INTRAVENOUS; SUBCUTANEOUS at 02:28

## 2017-04-17 RX ADMIN — OLANZAPINE 5 MILLIGRAM(S): 15 TABLET, FILM COATED ORAL at 20:58

## 2017-04-17 RX ADMIN — MEMANTINE HYDROCHLORIDE 10 MILLIGRAM(S): 10 TABLET ORAL at 05:20

## 2017-04-17 RX ADMIN — Medication 1 DROP(S): at 05:21

## 2017-04-17 RX ADMIN — MEROPENEM 200 MILLIGRAM(S): 1 INJECTION INTRAVENOUS at 20:59

## 2017-04-17 RX ADMIN — Medication 100 MILLIEQUIVALENT(S): at 00:05

## 2017-04-17 RX ADMIN — DONEPEZIL HYDROCHLORIDE 10 MILLIGRAM(S): 10 TABLET, FILM COATED ORAL at 20:57

## 2017-04-17 RX ADMIN — SODIUM CHLORIDE 50 MILLILITER(S): 9 INJECTION INTRAMUSCULAR; INTRAVENOUS; SUBCUTANEOUS at 17:56

## 2017-04-17 NOTE — H&P ADULT. - PROBLEM SELECTOR PLAN 4
patient at mental baseline per son at bedside  continue with home medications memantine, olanzapine, and donepezil patient at mental baseline per son at bedside  continue with home medications memantine, olanzapine, and donepezil  aspiration precautions  fall precautions

## 2017-04-17 NOTE — H&P ADULT. - COMMENTS
Unable to obtain meaningfully given patient with advanced dementia Attempted to review, yet unable to obtain meaningfully given patient with advanced dementia

## 2017-04-17 NOTE — DIETITIAN INITIAL EVALUATION ADULT. - SOURCE
Comprehensive review of patient's medical chart, Previous RD note (10/7/16, 7/11/16, 5/25/16), RN/other (specify) Comprehensive review of patient's medical chart, Previous RD note (10/7/16, 7/11/16, 5/25/16), RN, PCA/other (specify)

## 2017-04-17 NOTE — H&P ADULT. - PROBLEM SELECTOR PLAN 3
patient noted to have baseline Hgb 12-13 at previous admissions. no reported hx of melena, BRBPR per caretaker or son. normocytic anemia.  -check stool guaic   -iron studies, ferritin, TIBC, serum iron, B12, folate, reticulocyte count in AM patient noted to have baseline Hgb 12-13 at previous admissions. no reported hx of melena, BRBPR per caretaker or son. normocytic anemia.  -check stool guaiac   -iron studies, ferritin, TIBC, serum iron, B12, folate, reticulocyte count in AM

## 2017-04-17 NOTE — H&P ADULT. - PROBLEM SELECTOR PLAN 2
patient with tachycardia, fever, and leukocytosis  -follow up blood cultures and urine cultures  -lactate noted to be improved  -check lactate in AM  -continue with volume repletion

## 2017-04-17 NOTE — H&P ADULT. - ASSESSMENT
87 y/o M hx advanced dementia, AOx1, chronic indwelling carlson, HTN, paroxysmal Afib presents with urosepsis. 85 y/o M hx advanced dementia (AAOx0-1), chronic indwelling carlson, HTN, paroxysmal Afib presents with UTI with resultant sepsis.

## 2017-04-17 NOTE — DIETITIAN INITIAL EVALUATION ADULT. - ENERGY NEEDS
Dosing weight (4/16): 128.9 pounds Height: 69 inches BMI: 19.0 kg/m^2   Ideal body weight: 160 pounds (+/-10%), 81% ideal body weight  Edema: 1+ left and right hand, 2+ left foot  No pressure ulcers noted.  Other pertinent information: Pt admitted with fever and rigors.

## 2017-04-17 NOTE — DIETITIAN INITIAL EVALUATION ADULT. - NS AS NUTRI INTERV COLLABORAT
Collaboration with other providers/Malnutrition sticker placed in chart. Collaboration with other providers/Malnutrition sticker placed in chart, discussed with team.

## 2017-04-17 NOTE — OCCUPATIONAL THERAPY INITIAL EVALUATION ADULT - MUSCLE TONE ASSESSMENT, REHAB EVAL
moderately increased tone/severely increased tone/bilateral lower extremities/bilateral upper extremities

## 2017-04-17 NOTE — DIETITIAN INITIAL EVALUATION ADULT. - PROBLEM SELECTOR PLAN 3
patient noted to have baseline Hgb 12-13 at previous admissions. no reported hx of melena, BRBPR per caretaker or son. normocytic anemia.  -check stool guaiac   -iron studies, ferritin, TIBC, serum iron, B12, folate, reticulocyte count in AM

## 2017-04-17 NOTE — OCCUPATIONAL THERAPY INITIAL EVALUATION ADULT - RANGE OF MOTION EXAMINATION, UPPER EXTREMITY
B hand PROM <half full ROM, B shoulders to 90* and B elbow PROM flexion/extension WFL, unable to assess AROM 2* to command follow

## 2017-04-17 NOTE — DIETITIAN INITIAL EVALUATION ADULT. - PROBLEM SELECTOR PLAN 1
patient with positive UA, chronic indwelling carlson with recurrent UTI in the past, some MDR organisms.   -s/p ?2L fluid repletion with improvement in lactate  -continue with broad spectrum meropenem.   -await urine and blood cultures for sensitivities and speciation.  -monitor fever curve  -Tylenol PRN  -Primary day team to call ID consult in AM

## 2017-04-17 NOTE — DIETITIAN INITIAL EVALUATION ADULT. - PT NOT SOURCE
Unable to obtain detailed nutrition history. Pt with advanced dementia, disoriented x4, no family at bedside.

## 2017-04-17 NOTE — H&P ADULT. - EKG AND INTERPRETATION
NSR EKG personally reviewed EKG personally reviewed HR 81 bpm appears sinus rhythm artifact likely 2/2 to tremors

## 2017-04-17 NOTE — OCCUPATIONAL THERAPY INITIAL EVALUATION ADULT - ADDITIONAL COMMENTS
Patient's son was concerned for seizure and called EMS. Otherwise, patient has been in his USOH. He suffers from advanced dementia. Patient's caretakers have not noticed any unusual events. He has a chronic indwelling Barr which was exchanged about three weeks prior to admission. He has had chronic Barr for the last nine months. Xray Chest (-)

## 2017-04-17 NOTE — DIETITIAN INITIAL EVALUATION ADULT. - NS AS NUTRI INTERV MEALS SNACK
1.) Continue current diet; defer food/fluid texture to team. 2.) Consider adding Ensure thickened to ordered honey consistency to supplement po intake. 3.) Encourage po intake. 1.) Continue current diet; defer food/fluid texture to team. 2.) Consider speech and swallow evaluation 3.) Consider adding Ensure thickened to ordered honey consistency to supplement po intake. 4.) Encourage po intake. 1.) Continue current diet; defer food/fluid texture to team. 2.) Consider speech and swallow evaluation 3.) May consider adding Ensure 2x/day, thickened to ordered honey consistency to supplement po intake. 4.) Encourage po intake.

## 2017-04-17 NOTE — OCCUPATIONAL THERAPY INITIAL EVALUATION ADULT - MANUAL MUSCLE TESTING RESULTS, REHAB EVAL
unable to assess 2* to command follow, limited spontaneous movement noted with severe tremor/grossly assessed due to

## 2017-04-17 NOTE — H&P ADULT. - PROBLEM SELECTOR PLAN 1
patient with positive UA, chronic indwelling carlson with recurrent UTI in the past, some MDR organisms.   -s/p 2L fluid repletion with improvement in lactate  -conitnue patient with positive UA, chronic indwelling carlson with recurrent UTI in the past, some MDR organisms.   -s/p 2L fluid repletion with improvement in lactate  -continue with broad spectrum meropenem.   -await urine and blood cultures for sensitivities and speciation.  -monitor fever curve  -Tylenol PRN patient with positive UA, chronic indwelling carlson with recurrent UTI in the past, some MDR organisms.   -s/p ?2L fluid repletion with improvement in lactate  -continue with broad spectrum meropenem.   -await urine and blood cultures for sensitivities and speciation.  -monitor fever curve  -Tylenol PRN  -Primary day team to call ID consult in AM

## 2017-04-17 NOTE — PROVIDER CONTACT NOTE (CRITICAL VALUE NOTIFICATION) - SITUATION
Patient had blood cultures done on 4/16/2017, results indicate growth in aerobic and anerobic bottles of gram negative rods.

## 2017-04-17 NOTE — H&P ADULT. - PROBLEM SELECTOR PLAN 9
Discussed advanced directives with sonCornelio- states patient's HCP.  Patient is DNR/DNI.   Documentation complete in chart  Time spent ~20 min

## 2017-04-17 NOTE — H&P ADULT. - HISTORY OF PRESENT ILLNESS
87 y/o M hx of dementia, baseline    ED Vitals: Tmax 102.6,  --> 67, -115/53-58, RR 17-18, O2 Sat 98-99 RA  He received Tylenol, Meropenem x 1 dose, Potassium repletion, 500 cc NS bolus 85 y/o M hx of dementia, baseline alert and oriented to person only, paroxysmal Afib not on A/C, gout, HTN, chronic indwelling Barr catheter presents with fevers and rigors.    History was obtained from patient's at bedside. According to patient's son, patient had abrupt onset of rigors and total body shaking late this evening, lasting about two hours. Patient's son was concerned for seizure and called EMS.    Otherwise, patient has been in his USOH. He suffers from advanced dementia. Patient's caretakers have not noticed any unusual events. He has a chronic indwelling Barr which was exchanged about three weeks prior to admission. He has had chronic Barr for the last nine months.     No recent changes in medications. No sicks contacts or exposures. Patient has had numerous ED and inpatient admissions for UTI sepsis in the past. Upon review of prior cultures, has had grown both sensitive and MDR organisms.    ED Vitals: Tmax 102.6,  --> 67, -115/53-58, RR 17-18, O2 Sat 98-99 RA  He received Tylenol, Meropenem x 1 dose, Potassium repletion, 500 cc NS bolus

## 2017-04-17 NOTE — OCCUPATIONAL THERAPY INITIAL EVALUATION ADULT - PERTINENT HX OF CURRENT PROBLEM, REHAB EVAL
85 y/o M hx of dementia, baseline A&O to person only, paroxysmal Afib not on A/C, gout, HTN, chronic indwelling Barr catheter p/w fevers and rigors... see below

## 2017-04-17 NOTE — H&P ADULT. - ATTENDING COMMENTS
85 y/o M hx of dementia, baseline alert and oriented to person only, paroxysmal Afib not on A/C, gout, HTN, chronic indwelling Carlson catheter presents with fevers and rigors. Confirmed HPI  with son, Cornelio Sotelo. Attempted to obtain ROS yet limited as patient with advanced dementia.  Vitals reviewed and physically examined patient: agree with resident's findings as noted above. Patient does not appear in acute distress  Labs, imaging and EKG personally reviewed  UTI resulting in sepsis as patient with fever as high 102.6, tachycardia, leukocytosis and UA suggestive of UTI in setting of indwelling carlson catheter.  History of MDR organisms, ESBL E.coli and Proteus. -continue with broad spectrum meropenem. -await urine and blood cultures for sensitivities and speciation. -Primary day team to call ID consult in AM.- Continue with IVF maintenance.  Remainder of plan as detailed above. Discussed plan with patient's son, Cornelio, expressed understanding of plan  Advanced care planning discussion held with Cornelio Sotelo as detailed above.

## 2017-04-17 NOTE — DIETITIAN INITIAL EVALUATION ADULT. - PHYSICAL APPEARANCE
Per previous RD notes, weights noted as: (5/25/16) 130 pounds, (7/11/16) 140.2 pounds, (10/7/16) 139.5 pounds. Current weight noted as 128.9 pounds; reflecting 10.6 pound weight loss x6 months.

## 2017-04-17 NOTE — DIETITIAN INITIAL EVALUATION ADULT. - OTHER INFO
Nutrition assessment initiated for consult for chewing/swallowing difficulty. Per RN, pt with no N+V. BM 4/17 x2. Per PCA, pt ate 100% of breakfast this morning. No food allergies noted.

## 2017-04-17 NOTE — DIETITIAN INITIAL EVALUATION ADULT. - PROBLEM SELECTOR PLAN 4
patient at mental baseline per son at bedside  continue with home medications memantine, olanzapine, and donepezil  aspiration precautions  fall precautions

## 2017-04-18 LAB
-  AMIKACIN: SIGNIFICANT CHANGE UP
-  AMPICILLIN/SULBACTAM: SIGNIFICANT CHANGE UP
-  AMPICILLIN: SIGNIFICANT CHANGE UP
-  AZTREONAM: SIGNIFICANT CHANGE UP
-  CEFAZOLIN: SIGNIFICANT CHANGE UP
-  CEFEPIME: SIGNIFICANT CHANGE UP
-  CEFOXITIN: SIGNIFICANT CHANGE UP
-  CEFTAZIDIME: SIGNIFICANT CHANGE UP
-  CEFTRIAXONE: SIGNIFICANT CHANGE UP
-  CIPROFLOXACIN: SIGNIFICANT CHANGE UP
-  ERTAPENEM: SIGNIFICANT CHANGE UP
-  GENTAMICIN: SIGNIFICANT CHANGE UP
-  LEVOFLOXACIN: SIGNIFICANT CHANGE UP
-  MEROPENEM: SIGNIFICANT CHANGE UP
-  NITROFURANTOIN: SIGNIFICANT CHANGE UP
-  PIPERACILLIN/TAZOBACTAM: SIGNIFICANT CHANGE UP
-  TOBRAMYCIN: SIGNIFICANT CHANGE UP
-  TRIMETHOPRIM/SULFAMETHOXAZOLE: SIGNIFICANT CHANGE UP
ANION GAP SERPL CALC-SCNC: 14 MMOL/L — SIGNIFICANT CHANGE UP (ref 5–17)
BASOPHILS # BLD AUTO: 0.04 K/UL — SIGNIFICANT CHANGE UP (ref 0–0.2)
BASOPHILS NFR BLD AUTO: 0.4 % — SIGNIFICANT CHANGE UP (ref 0–2)
BUN SERPL-MCNC: 32 MG/DL — HIGH (ref 7–23)
CALCIUM SERPL-MCNC: 8.3 MG/DL — LOW (ref 8.4–10.5)
CHLORIDE SERPL-SCNC: 111 MMOL/L — HIGH (ref 96–108)
CO2 SERPL-SCNC: 19 MMOL/L — LOW (ref 22–31)
CREAT SERPL-MCNC: 1.02 MG/DL — SIGNIFICANT CHANGE UP (ref 0.5–1.3)
EOSINOPHIL # BLD AUTO: 0.07 K/UL — SIGNIFICANT CHANGE UP (ref 0–0.5)
EOSINOPHIL NFR BLD AUTO: 0.8 % — SIGNIFICANT CHANGE UP (ref 0–6)
GLUCOSE SERPL-MCNC: 136 MG/DL — HIGH (ref 70–99)
GRAM STN FLD: SIGNIFICANT CHANGE UP
HCT VFR BLD CALC: 31.1 % — LOW (ref 39–50)
HGB BLD-MCNC: 9.6 G/DL — LOW (ref 13–17)
IMM GRANULOCYTES NFR BLD AUTO: 0.4 % — SIGNIFICANT CHANGE UP (ref 0–1.5)
LYMPHOCYTES # BLD AUTO: 0.71 K/UL — LOW (ref 1–3.3)
LYMPHOCYTES # BLD AUTO: 8 % — LOW (ref 13–44)
MAGNESIUM SERPL-MCNC: 1.9 MG/DL — SIGNIFICANT CHANGE UP (ref 1.6–2.6)
MCHC RBC-ENTMCNC: 28.2 PG — SIGNIFICANT CHANGE UP (ref 27–34)
MCHC RBC-ENTMCNC: 30.9 GM/DL — LOW (ref 32–36)
MCV RBC AUTO: 91.5 FL — SIGNIFICANT CHANGE UP (ref 80–100)
METHOD TYPE: SIGNIFICANT CHANGE UP
MONOCYTES # BLD AUTO: 0.57 K/UL — SIGNIFICANT CHANGE UP (ref 0–0.9)
MONOCYTES NFR BLD AUTO: 6.4 % — SIGNIFICANT CHANGE UP (ref 2–14)
NEUTROPHILS # BLD AUTO: 7.46 K/UL — HIGH (ref 1.8–7.4)
NEUTROPHILS NFR BLD AUTO: 84 % — HIGH (ref 43–77)
PHOSPHATE SERPL-MCNC: 2.6 MG/DL — SIGNIFICANT CHANGE UP (ref 2.5–4.5)
PLATELET # BLD AUTO: 199 K/UL — SIGNIFICANT CHANGE UP (ref 150–400)
POTASSIUM SERPL-MCNC: 4.3 MMOL/L — SIGNIFICANT CHANGE UP (ref 3.5–5.3)
POTASSIUM SERPL-SCNC: 4.3 MMOL/L — SIGNIFICANT CHANGE UP (ref 3.5–5.3)
RBC # BLD: 3.4 M/UL — LOW (ref 4.2–5.8)
RBC # FLD: 15.5 % — HIGH (ref 10.3–14.5)
SODIUM SERPL-SCNC: 144 MMOL/L — SIGNIFICANT CHANGE UP (ref 135–145)
WBC # BLD: 8.89 K/UL — SIGNIFICANT CHANGE UP (ref 3.8–10.5)
WBC # FLD AUTO: 8.89 K/UL — SIGNIFICANT CHANGE UP (ref 3.8–10.5)

## 2017-04-18 PROCEDURE — 99232 SBSQ HOSP IP/OBS MODERATE 35: CPT

## 2017-04-18 PROCEDURE — 99233 SBSQ HOSP IP/OBS HIGH 50: CPT | Mod: GC

## 2017-04-18 RX ORDER — HEPARIN SODIUM 5000 [USP'U]/ML
5000 INJECTION INTRAVENOUS; SUBCUTANEOUS EVERY 12 HOURS
Qty: 0 | Refills: 0 | Status: DISCONTINUED | OUTPATIENT
Start: 2017-04-18 | End: 2017-05-01

## 2017-04-18 RX ORDER — VANCOMYCIN HCL 1 G
VIAL (EA) INTRAVENOUS
Qty: 0 | Refills: 0 | Status: DISCONTINUED | OUTPATIENT
Start: 2017-04-18 | End: 2017-04-18

## 2017-04-18 RX ORDER — SODIUM CHLORIDE 9 MG/ML
1000 INJECTION INTRAMUSCULAR; INTRAVENOUS; SUBCUTANEOUS
Qty: 0 | Refills: 0 | Status: DISCONTINUED | OUTPATIENT
Start: 2017-04-18 | End: 2017-04-19

## 2017-04-18 RX ORDER — VANCOMYCIN HCL 1 G
750 VIAL (EA) INTRAVENOUS ONCE
Qty: 0 | Refills: 0 | Status: COMPLETED | OUTPATIENT
Start: 2017-04-18 | End: 2017-04-18

## 2017-04-18 RX ADMIN — Medication 1 DROP(S): at 17:31

## 2017-04-18 RX ADMIN — SODIUM CHLORIDE 75 MILLILITER(S): 9 INJECTION INTRAMUSCULAR; INTRAVENOUS; SUBCUTANEOUS at 16:03

## 2017-04-18 RX ADMIN — Medication 1 DROP(S): at 06:35

## 2017-04-18 RX ADMIN — MEROPENEM 200 MILLIGRAM(S): 1 INJECTION INTRAVENOUS at 20:51

## 2017-04-18 RX ADMIN — DONEPEZIL HYDROCHLORIDE 10 MILLIGRAM(S): 10 TABLET, FILM COATED ORAL at 22:12

## 2017-04-18 RX ADMIN — MEMANTINE HYDROCHLORIDE 10 MILLIGRAM(S): 10 TABLET ORAL at 06:34

## 2017-04-18 RX ADMIN — TAMSULOSIN HYDROCHLORIDE 0.4 MILLIGRAM(S): 0.4 CAPSULE ORAL at 22:12

## 2017-04-18 RX ADMIN — SODIUM CHLORIDE 75 MILLILITER(S): 9 INJECTION INTRAMUSCULAR; INTRAVENOUS; SUBCUTANEOUS at 20:51

## 2017-04-18 RX ADMIN — HEPARIN SODIUM 5000 UNIT(S): 5000 INJECTION INTRAVENOUS; SUBCUTANEOUS at 17:34

## 2017-04-18 RX ADMIN — MEROPENEM 200 MILLIGRAM(S): 1 INJECTION INTRAVENOUS at 09:45

## 2017-04-18 RX ADMIN — Medication 150 MILLIGRAM(S): at 13:16

## 2017-04-18 RX ADMIN — LATANOPROST 1 DROP(S): 0.05 SOLUTION/ DROPS OPHTHALMIC; TOPICAL at 22:12

## 2017-04-18 RX ADMIN — OLANZAPINE 5 MILLIGRAM(S): 15 TABLET, FILM COATED ORAL at 22:12

## 2017-04-18 RX ADMIN — MEMANTINE HYDROCHLORIDE 10 MILLIGRAM(S): 10 TABLET ORAL at 17:30

## 2017-04-18 NOTE — PHYSICAL THERAPY INITIAL EVALUATION ADULT - PERTINENT HX OF CURRENT PROBLEM, REHAB EVAL
Pt is a 86 y.o male hx of dementia, baseline alert and oriented to person only, paroxysmal Afib not on A/C, gout, HTN, chronic indwelling Barr catheter presents with fevers and rigors.

## 2017-04-18 NOTE — PHYSICAL THERAPY INITIAL EVALUATION ADULT - PASSIVE RANGE OF MOTION EXAMINATION, REHAB EVAL
BUE contractures elbow 90 degrees, hands in fists. BLE contractures knee ext -8 degrees, ankle DF -5 degrees

## 2017-04-18 NOTE — PROVIDER CONTACT NOTE (CRITICAL VALUE NOTIFICATION) - SITUATION
Pt admitted with sepsis on 4/16/17,chronic carlson, presents with uti with resultant sepsis. Blood cultures results indicate gram negative rods.

## 2017-04-19 LAB
-  AMIKACIN: SIGNIFICANT CHANGE UP
-  AMIKACIN: SIGNIFICANT CHANGE UP
-  AMPICILLIN/SULBACTAM: SIGNIFICANT CHANGE UP
-  AMPICILLIN/SULBACTAM: SIGNIFICANT CHANGE UP
-  AMPICILLIN: SIGNIFICANT CHANGE UP
-  AMPICILLIN: SIGNIFICANT CHANGE UP
-  AZTREONAM: SIGNIFICANT CHANGE UP
-  AZTREONAM: SIGNIFICANT CHANGE UP
-  CEFAZOLIN: SIGNIFICANT CHANGE UP
-  CEFAZOLIN: SIGNIFICANT CHANGE UP
-  CEFEPIME: SIGNIFICANT CHANGE UP
-  CEFEPIME: SIGNIFICANT CHANGE UP
-  CEFOXITIN: SIGNIFICANT CHANGE UP
-  CEFOXITIN: SIGNIFICANT CHANGE UP
-  CEFTAZIDIME: SIGNIFICANT CHANGE UP
-  CEFTAZIDIME: SIGNIFICANT CHANGE UP
-  CEFTRIAXONE: SIGNIFICANT CHANGE UP
-  CEFTRIAXONE: SIGNIFICANT CHANGE UP
-  CIPROFLOXACIN: SIGNIFICANT CHANGE UP
-  CIPROFLOXACIN: SIGNIFICANT CHANGE UP
-  ERTAPENEM: SIGNIFICANT CHANGE UP
-  ERTAPENEM: SIGNIFICANT CHANGE UP
-  GENTAMICIN: SIGNIFICANT CHANGE UP
-  GENTAMICIN: SIGNIFICANT CHANGE UP
-  IMIPENEM: SIGNIFICANT CHANGE UP
-  LEVOFLOXACIN: SIGNIFICANT CHANGE UP
-  LEVOFLOXACIN: SIGNIFICANT CHANGE UP
-  MEROPENEM: SIGNIFICANT CHANGE UP
-  MEROPENEM: SIGNIFICANT CHANGE UP
-  NITROFURANTOIN: SIGNIFICANT CHANGE UP
-  PIPERACILLIN/TAZOBACTAM: SIGNIFICANT CHANGE UP
-  PIPERACILLIN/TAZOBACTAM: SIGNIFICANT CHANGE UP
-  TOBRAMYCIN: SIGNIFICANT CHANGE UP
-  TOBRAMYCIN: SIGNIFICANT CHANGE UP
-  TRIMETHOPRIM/SULFAMETHOXAZOLE: SIGNIFICANT CHANGE UP
-  TRIMETHOPRIM/SULFAMETHOXAZOLE: SIGNIFICANT CHANGE UP
ANION GAP SERPL CALC-SCNC: 14 MMOL/L — SIGNIFICANT CHANGE UP (ref 5–17)
BUN SERPL-MCNC: 31 MG/DL — HIGH (ref 7–23)
CALCIUM SERPL-MCNC: 8.4 MG/DL — SIGNIFICANT CHANGE UP (ref 8.4–10.5)
CHLORIDE SERPL-SCNC: 111 MMOL/L — HIGH (ref 96–108)
CO2 SERPL-SCNC: 22 MMOL/L — SIGNIFICANT CHANGE UP (ref 22–31)
CREAT SERPL-MCNC: 0.85 MG/DL — SIGNIFICANT CHANGE UP (ref 0.5–1.3)
CULTURE RESULTS: SIGNIFICANT CHANGE UP
GLUCOSE SERPL-MCNC: 112 MG/DL — HIGH (ref 70–99)
GRAM STN FLD: SIGNIFICANT CHANGE UP
HCT VFR BLD CALC: 31.9 % — LOW (ref 39–50)
HGB BLD-MCNC: 9.8 G/DL — LOW (ref 13–17)
MAGNESIUM SERPL-MCNC: 2.2 MG/DL — SIGNIFICANT CHANGE UP (ref 1.6–2.6)
MCHC RBC-ENTMCNC: 28.1 PG — SIGNIFICANT CHANGE UP (ref 27–34)
MCHC RBC-ENTMCNC: 30.7 GM/DL — LOW (ref 32–36)
MCV RBC AUTO: 91.4 FL — SIGNIFICANT CHANGE UP (ref 80–100)
METHOD TYPE: SIGNIFICANT CHANGE UP
METHOD TYPE: SIGNIFICANT CHANGE UP
ORGANISM # SPEC MICROSCOPIC CNT: SIGNIFICANT CHANGE UP
PHOSPHATE SERPL-MCNC: 2.6 MG/DL — SIGNIFICANT CHANGE UP (ref 2.5–4.5)
PLATELET # BLD AUTO: 221 K/UL — SIGNIFICANT CHANGE UP (ref 150–400)
POTASSIUM SERPL-MCNC: 4 MMOL/L — SIGNIFICANT CHANGE UP (ref 3.5–5.3)
POTASSIUM SERPL-SCNC: 4 MMOL/L — SIGNIFICANT CHANGE UP (ref 3.5–5.3)
RBC # BLD: 3.49 M/UL — LOW (ref 4.2–5.8)
RBC # FLD: 15.3 % — HIGH (ref 10.3–14.5)
SODIUM SERPL-SCNC: 147 MMOL/L — HIGH (ref 135–145)
SPECIMEN SOURCE: SIGNIFICANT CHANGE UP
WBC # BLD: 7.68 K/UL — SIGNIFICANT CHANGE UP (ref 3.8–10.5)
WBC # FLD AUTO: 7.68 K/UL — SIGNIFICANT CHANGE UP (ref 3.8–10.5)

## 2017-04-19 PROCEDURE — 76775 US EXAM ABDO BACK WALL LIM: CPT | Mod: 26

## 2017-04-19 PROCEDURE — 99232 SBSQ HOSP IP/OBS MODERATE 35: CPT

## 2017-04-19 PROCEDURE — 99233 SBSQ HOSP IP/OBS HIGH 50: CPT | Mod: GC

## 2017-04-19 RX ORDER — SODIUM CHLORIDE 9 MG/ML
1000 INJECTION, SOLUTION INTRAVENOUS
Qty: 0 | Refills: 0 | Status: DISCONTINUED | OUTPATIENT
Start: 2017-04-19 | End: 2017-04-20

## 2017-04-19 RX ORDER — VANCOMYCIN HCL 1 G
750 VIAL (EA) INTRAVENOUS ONCE
Qty: 0 | Refills: 0 | Status: DISCONTINUED | OUTPATIENT
Start: 2017-04-19 | End: 2017-04-19

## 2017-04-19 RX ORDER — VANCOMYCIN HCL 1 G
750 VIAL (EA) INTRAVENOUS EVERY 12 HOURS
Qty: 0 | Refills: 0 | Status: DISCONTINUED | OUTPATIENT
Start: 2017-04-19 | End: 2017-04-20

## 2017-04-19 RX ORDER — CEFTRIAXONE 500 MG/1
2 INJECTION, POWDER, FOR SOLUTION INTRAMUSCULAR; INTRAVENOUS EVERY 24 HOURS
Qty: 0 | Refills: 0 | Status: DISCONTINUED | OUTPATIENT
Start: 2017-04-19 | End: 2017-04-20

## 2017-04-19 RX ORDER — CEFTRIAXONE 500 MG/1
1 INJECTION, POWDER, FOR SOLUTION INTRAMUSCULAR; INTRAVENOUS EVERY 24 HOURS
Qty: 0 | Refills: 0 | Status: DISCONTINUED | OUTPATIENT
Start: 2017-04-19 | End: 2017-04-19

## 2017-04-19 RX ORDER — AMLODIPINE BESYLATE 2.5 MG/1
10 TABLET ORAL DAILY
Qty: 0 | Refills: 0 | Status: DISCONTINUED | OUTPATIENT
Start: 2017-04-19 | End: 2017-04-21

## 2017-04-19 RX ADMIN — OLANZAPINE 5 MILLIGRAM(S): 15 TABLET, FILM COATED ORAL at 22:30

## 2017-04-19 RX ADMIN — CEFTRIAXONE 100 GRAM(S): 500 INJECTION, POWDER, FOR SOLUTION INTRAMUSCULAR; INTRAVENOUS at 11:51

## 2017-04-19 RX ADMIN — MEMANTINE HYDROCHLORIDE 10 MILLIGRAM(S): 10 TABLET ORAL at 17:01

## 2017-04-19 RX ADMIN — HEPARIN SODIUM 5000 UNIT(S): 5000 INJECTION INTRAVENOUS; SUBCUTANEOUS at 05:26

## 2017-04-19 RX ADMIN — MEMANTINE HYDROCHLORIDE 10 MILLIGRAM(S): 10 TABLET ORAL at 05:26

## 2017-04-19 RX ADMIN — Medication 1 DROP(S): at 17:01

## 2017-04-19 RX ADMIN — Medication 150 MILLIGRAM(S): at 17:00

## 2017-04-19 RX ADMIN — MEROPENEM 200 MILLIGRAM(S): 1 INJECTION INTRAVENOUS at 08:05

## 2017-04-19 RX ADMIN — Medication 1 DROP(S): at 05:26

## 2017-04-19 RX ADMIN — DONEPEZIL HYDROCHLORIDE 10 MILLIGRAM(S): 10 TABLET, FILM COATED ORAL at 22:30

## 2017-04-19 RX ADMIN — TAMSULOSIN HYDROCHLORIDE 0.4 MILLIGRAM(S): 0.4 CAPSULE ORAL at 22:30

## 2017-04-19 RX ADMIN — LATANOPROST 1 DROP(S): 0.05 SOLUTION/ DROPS OPHTHALMIC; TOPICAL at 22:30

## 2017-04-19 RX ADMIN — HEPARIN SODIUM 5000 UNIT(S): 5000 INJECTION INTRAVENOUS; SUBCUTANEOUS at 17:01

## 2017-04-20 LAB
-  AMPICILLIN/SULBACTAM: SIGNIFICANT CHANGE UP
-  CEFAZOLIN: SIGNIFICANT CHANGE UP
-  CIPROFLOXACIN: SIGNIFICANT CHANGE UP
-  CLINDAMYCIN: SIGNIFICANT CHANGE UP
-  ERYTHROMYCIN: SIGNIFICANT CHANGE UP
-  GENTAMICIN: SIGNIFICANT CHANGE UP
-  LEVOFLOXACIN: SIGNIFICANT CHANGE UP
-  MOXIFLOXACIN(AEROBIC): SIGNIFICANT CHANGE UP
-  OXACILLIN: SIGNIFICANT CHANGE UP
-  PENICILLIN: SIGNIFICANT CHANGE UP
-  RIFAMPIN: SIGNIFICANT CHANGE UP
-  TETRACYCLINE: SIGNIFICANT CHANGE UP
-  TRIMETHOPRIM/SULFAMETHOXAZOLE: SIGNIFICANT CHANGE UP
-  VANCOMYCIN: SIGNIFICANT CHANGE UP
ANION GAP SERPL CALC-SCNC: 11 MMOL/L — SIGNIFICANT CHANGE UP (ref 5–17)
BUN SERPL-MCNC: 29 MG/DL — HIGH (ref 7–23)
CALCIUM SERPL-MCNC: 8.1 MG/DL — LOW (ref 8.4–10.5)
CHLORIDE SERPL-SCNC: 113 MMOL/L — HIGH (ref 96–108)
CO2 SERPL-SCNC: 26 MMOL/L — SIGNIFICANT CHANGE UP (ref 22–31)
CREAT SERPL-MCNC: 0.83 MG/DL — SIGNIFICANT CHANGE UP (ref 0.5–1.3)
CULTURE RESULTS: SIGNIFICANT CHANGE UP
CULTURE RESULTS: SIGNIFICANT CHANGE UP
GLUCOSE SERPL-MCNC: 131 MG/DL — HIGH (ref 70–99)
HCT VFR BLD CALC: 31.3 % — LOW (ref 39–50)
HGB BLD-MCNC: 9.8 G/DL — LOW (ref 13–17)
MAGNESIUM SERPL-MCNC: 2.1 MG/DL — SIGNIFICANT CHANGE UP (ref 1.6–2.6)
MCHC RBC-ENTMCNC: 28.5 PG — SIGNIFICANT CHANGE UP (ref 27–34)
MCHC RBC-ENTMCNC: 31.3 GM/DL — LOW (ref 32–36)
MCV RBC AUTO: 91 FL — SIGNIFICANT CHANGE UP (ref 80–100)
METHOD TYPE: SIGNIFICANT CHANGE UP
ORGANISM # SPEC MICROSCOPIC CNT: SIGNIFICANT CHANGE UP
PHOSPHATE SERPL-MCNC: 2 MG/DL — LOW (ref 2.5–4.5)
PLATELET # BLD AUTO: 229 K/UL — SIGNIFICANT CHANGE UP (ref 150–400)
POTASSIUM SERPL-MCNC: 3.6 MMOL/L — SIGNIFICANT CHANGE UP (ref 3.5–5.3)
POTASSIUM SERPL-SCNC: 3.6 MMOL/L — SIGNIFICANT CHANGE UP (ref 3.5–5.3)
RBC # BLD: 3.44 M/UL — LOW (ref 4.2–5.8)
RBC # FLD: 15 % — HIGH (ref 10.3–14.5)
SODIUM SERPL-SCNC: 150 MMOL/L — HIGH (ref 135–145)
SPECIMEN SOURCE: SIGNIFICANT CHANGE UP
SPECIMEN SOURCE: SIGNIFICANT CHANGE UP
WBC # BLD: 9.5 K/UL — SIGNIFICANT CHANGE UP (ref 3.8–10.5)
WBC # FLD AUTO: 9.5 K/UL — SIGNIFICANT CHANGE UP (ref 3.8–10.5)

## 2017-04-20 PROCEDURE — 99232 SBSQ HOSP IP/OBS MODERATE 35: CPT

## 2017-04-20 PROCEDURE — 71010: CPT | Mod: 26

## 2017-04-20 RX ORDER — FUROSEMIDE 40 MG
40 TABLET ORAL ONCE
Qty: 0 | Refills: 0 | Status: COMPLETED | OUTPATIENT
Start: 2017-04-20 | End: 2017-04-20

## 2017-04-20 RX ORDER — CEFEPIME 1 G/1
2000 INJECTION, POWDER, FOR SOLUTION INTRAMUSCULAR; INTRAVENOUS EVERY 12 HOURS
Qty: 0 | Refills: 0 | Status: DISCONTINUED | OUTPATIENT
Start: 2017-04-20 | End: 2017-04-30

## 2017-04-20 RX ORDER — HYDRALAZINE HCL 50 MG
5 TABLET ORAL ONCE
Qty: 0 | Refills: 0 | Status: COMPLETED | OUTPATIENT
Start: 2017-04-20 | End: 2017-04-20

## 2017-04-20 RX ORDER — SODIUM,POTASSIUM PHOSPHATES 278-250MG
1 POWDER IN PACKET (EA) ORAL
Qty: 0 | Refills: 0 | Status: COMPLETED | OUTPATIENT
Start: 2017-04-20 | End: 2017-04-20

## 2017-04-20 RX ORDER — SODIUM CHLORIDE 9 MG/ML
1000 INJECTION, SOLUTION INTRAVENOUS
Qty: 0 | Refills: 0 | Status: DISCONTINUED | OUTPATIENT
Start: 2017-04-20 | End: 2017-04-20

## 2017-04-20 RX ORDER — SODIUM CHLORIDE 9 MG/ML
1000 INJECTION, SOLUTION INTRAVENOUS
Qty: 0 | Refills: 0 | Status: DISCONTINUED | OUTPATIENT
Start: 2017-04-20 | End: 2017-04-21

## 2017-04-20 RX ADMIN — HEPARIN SODIUM 5000 UNIT(S): 5000 INJECTION INTRAVENOUS; SUBCUTANEOUS at 17:43

## 2017-04-20 RX ADMIN — MEMANTINE HYDROCHLORIDE 10 MILLIGRAM(S): 10 TABLET ORAL at 06:32

## 2017-04-20 RX ADMIN — HEPARIN SODIUM 5000 UNIT(S): 5000 INJECTION INTRAVENOUS; SUBCUTANEOUS at 06:34

## 2017-04-20 RX ADMIN — MEMANTINE HYDROCHLORIDE 10 MILLIGRAM(S): 10 TABLET ORAL at 17:43

## 2017-04-20 RX ADMIN — Medication 1 TABLET(S): at 17:42

## 2017-04-20 RX ADMIN — Medication 1 DROP(S): at 17:43

## 2017-04-20 RX ADMIN — Medication 40 MILLIGRAM(S): at 14:01

## 2017-04-20 RX ADMIN — DONEPEZIL HYDROCHLORIDE 10 MILLIGRAM(S): 10 TABLET, FILM COATED ORAL at 21:19

## 2017-04-20 RX ADMIN — Medication 1 DROP(S): at 06:31

## 2017-04-20 RX ADMIN — Medication 1 TABLET(S): at 10:59

## 2017-04-20 RX ADMIN — LATANOPROST 1 DROP(S): 0.05 SOLUTION/ DROPS OPHTHALMIC; TOPICAL at 21:19

## 2017-04-20 RX ADMIN — CEFEPIME 100 MILLIGRAM(S): 1 INJECTION, POWDER, FOR SOLUTION INTRAMUSCULAR; INTRAVENOUS at 18:02

## 2017-04-20 RX ADMIN — SODIUM CHLORIDE 50 MILLILITER(S): 9 INJECTION, SOLUTION INTRAVENOUS at 21:19

## 2017-04-20 RX ADMIN — OLANZAPINE 5 MILLIGRAM(S): 15 TABLET, FILM COATED ORAL at 21:19

## 2017-04-20 RX ADMIN — Medication 1 TABLET(S): at 14:05

## 2017-04-20 RX ADMIN — Medication 150 MILLIGRAM(S): at 06:29

## 2017-04-20 RX ADMIN — AMLODIPINE BESYLATE 10 MILLIGRAM(S): 2.5 TABLET ORAL at 06:34

## 2017-04-21 LAB
ANION GAP SERPL CALC-SCNC: 12 MMOL/L — SIGNIFICANT CHANGE UP (ref 5–17)
ANION GAP SERPL CALC-SCNC: 12 MMOL/L — SIGNIFICANT CHANGE UP (ref 5–17)
ANION GAP SERPL CALC-SCNC: 15 MMOL/L — SIGNIFICANT CHANGE UP (ref 5–17)
BUN SERPL-MCNC: 20 MG/DL — SIGNIFICANT CHANGE UP (ref 7–23)
BUN SERPL-MCNC: 22 MG/DL — SIGNIFICANT CHANGE UP (ref 7–23)
BUN SERPL-MCNC: 22 MG/DL — SIGNIFICANT CHANGE UP (ref 7–23)
CALCIUM SERPL-MCNC: 8.1 MG/DL — LOW (ref 8.4–10.5)
CALCIUM SERPL-MCNC: 8.2 MG/DL — LOW (ref 8.4–10.5)
CALCIUM SERPL-MCNC: 8.3 MG/DL — LOW (ref 8.4–10.5)
CHLORIDE SERPL-SCNC: 105 MMOL/L — SIGNIFICANT CHANGE UP (ref 96–108)
CHLORIDE SERPL-SCNC: 107 MMOL/L — SIGNIFICANT CHANGE UP (ref 96–108)
CHLORIDE SERPL-SCNC: 108 MMOL/L — SIGNIFICANT CHANGE UP (ref 96–108)
CO2 SERPL-SCNC: 29 MMOL/L — SIGNIFICANT CHANGE UP (ref 22–31)
CO2 SERPL-SCNC: 29 MMOL/L — SIGNIFICANT CHANGE UP (ref 22–31)
CO2 SERPL-SCNC: 30 MMOL/L — SIGNIFICANT CHANGE UP (ref 22–31)
CREAT SERPL-MCNC: 0.77 MG/DL — SIGNIFICANT CHANGE UP (ref 0.5–1.3)
CREAT SERPL-MCNC: 0.82 MG/DL — SIGNIFICANT CHANGE UP (ref 0.5–1.3)
CREAT SERPL-MCNC: 0.85 MG/DL — SIGNIFICANT CHANGE UP (ref 0.5–1.3)
GAS PNL BLDV: SIGNIFICANT CHANGE UP
GLUCOSE SERPL-MCNC: 102 MG/DL — HIGH (ref 70–99)
GLUCOSE SERPL-MCNC: 129 MG/DL — HIGH (ref 70–99)
GLUCOSE SERPL-MCNC: 141 MG/DL — HIGH (ref 70–99)
HCT VFR BLD CALC: 33.1 % — LOW (ref 39–50)
HCT VFR BLD CALC: 33.4 % — LOW (ref 39–50)
HGB BLD-MCNC: 10.6 G/DL — LOW (ref 13–17)
HGB BLD-MCNC: 10.9 G/DL — LOW (ref 13–17)
MAGNESIUM SERPL-MCNC: 1.8 MG/DL — SIGNIFICANT CHANGE UP (ref 1.6–2.6)
MAGNESIUM SERPL-MCNC: 1.9 MG/DL — SIGNIFICANT CHANGE UP (ref 1.6–2.6)
MAGNESIUM SERPL-MCNC: 2 MG/DL — SIGNIFICANT CHANGE UP (ref 1.6–2.6)
MCHC RBC-ENTMCNC: 29.5 PG — SIGNIFICANT CHANGE UP (ref 27–34)
MCHC RBC-ENTMCNC: 29.6 PG — SIGNIFICANT CHANGE UP (ref 27–34)
MCHC RBC-ENTMCNC: 31.9 GM/DL — LOW (ref 32–36)
MCHC RBC-ENTMCNC: 32.6 GM/DL — SIGNIFICANT CHANGE UP (ref 32–36)
MCV RBC AUTO: 91 FL — SIGNIFICANT CHANGE UP (ref 80–100)
MCV RBC AUTO: 92.5 FL — SIGNIFICANT CHANGE UP (ref 80–100)
PHOSPHATE SERPL-MCNC: 2.3 MG/DL — LOW (ref 2.5–4.5)
PHOSPHATE SERPL-MCNC: 2.9 MG/DL — SIGNIFICANT CHANGE UP (ref 2.5–4.5)
PHOSPHATE SERPL-MCNC: 3.1 MG/DL — SIGNIFICANT CHANGE UP (ref 2.5–4.5)
PLATELET # BLD AUTO: 242 K/UL — SIGNIFICANT CHANGE UP (ref 150–400)
PLATELET # BLD AUTO: 246 K/UL — SIGNIFICANT CHANGE UP (ref 150–400)
POTASSIUM SERPL-MCNC: 3 MMOL/L — LOW (ref 3.5–5.3)
POTASSIUM SERPL-MCNC: 3.1 MMOL/L — LOW (ref 3.5–5.3)
POTASSIUM SERPL-MCNC: 3.5 MMOL/L — SIGNIFICANT CHANGE UP (ref 3.5–5.3)
POTASSIUM SERPL-SCNC: 3 MMOL/L — LOW (ref 3.5–5.3)
POTASSIUM SERPL-SCNC: 3.1 MMOL/L — LOW (ref 3.5–5.3)
POTASSIUM SERPL-SCNC: 3.5 MMOL/L — SIGNIFICANT CHANGE UP (ref 3.5–5.3)
RBC # BLD: 3.58 M/UL — LOW (ref 4.2–5.8)
RBC # BLD: 3.67 M/UL — LOW (ref 4.2–5.8)
RBC # FLD: 13.1 % — SIGNIFICANT CHANGE UP (ref 10.3–14.5)
RBC # FLD: 13.4 % — SIGNIFICANT CHANGE UP (ref 10.3–14.5)
SODIUM SERPL-SCNC: 146 MMOL/L — HIGH (ref 135–145)
SODIUM SERPL-SCNC: 150 MMOL/L — HIGH (ref 135–145)
SODIUM SERPL-SCNC: 151 MMOL/L — HIGH (ref 135–145)
WBC # BLD: 11.1 K/UL — HIGH (ref 3.8–10.5)
WBC # BLD: 8.7 K/UL — SIGNIFICANT CHANGE UP (ref 3.8–10.5)
WBC # FLD AUTO: 11.1 K/UL — HIGH (ref 3.8–10.5)
WBC # FLD AUTO: 8.7 K/UL — SIGNIFICANT CHANGE UP (ref 3.8–10.5)

## 2017-04-21 PROCEDURE — 99223 1ST HOSP IP/OBS HIGH 75: CPT | Mod: GC

## 2017-04-21 PROCEDURE — 71010: CPT | Mod: 26

## 2017-04-21 PROCEDURE — 99232 SBSQ HOSP IP/OBS MODERATE 35: CPT

## 2017-04-21 PROCEDURE — 93010 ELECTROCARDIOGRAM REPORT: CPT | Mod: 77

## 2017-04-21 PROCEDURE — 93010 ELECTROCARDIOGRAM REPORT: CPT

## 2017-04-21 PROCEDURE — 93306 TTE W/DOPPLER COMPLETE: CPT | Mod: 26

## 2017-04-21 RX ORDER — SODIUM CHLORIDE 9 MG/ML
1000 INJECTION, SOLUTION INTRAVENOUS
Qty: 0 | Refills: 0 | Status: DISCONTINUED | OUTPATIENT
Start: 2017-04-21 | End: 2017-04-22

## 2017-04-21 RX ORDER — POTASSIUM CHLORIDE 20 MEQ
20 PACKET (EA) ORAL ONCE
Qty: 0 | Refills: 0 | Status: COMPLETED | OUTPATIENT
Start: 2017-04-21 | End: 2017-04-21

## 2017-04-21 RX ORDER — POTASSIUM CHLORIDE 20 MEQ
10 PACKET (EA) ORAL
Qty: 0 | Refills: 0 | Status: DISCONTINUED | OUTPATIENT
Start: 2017-04-21 | End: 2017-04-21

## 2017-04-21 RX ORDER — VANCOMYCIN HCL 1 G
1000 VIAL (EA) INTRAVENOUS EVERY 24 HOURS
Qty: 0 | Refills: 0 | Status: DISCONTINUED | OUTPATIENT
Start: 2017-04-21 | End: 2017-04-22

## 2017-04-21 RX ORDER — SODIUM CHLORIDE 9 MG/ML
1000 INJECTION, SOLUTION INTRAVENOUS
Qty: 0 | Refills: 0 | Status: DISCONTINUED | OUTPATIENT
Start: 2017-04-21 | End: 2017-04-21

## 2017-04-21 RX ADMIN — Medication 100 MILLIEQUIVALENT(S): at 17:01

## 2017-04-21 RX ADMIN — Medication 250 MILLIGRAM(S): at 19:50

## 2017-04-21 RX ADMIN — MEMANTINE HYDROCHLORIDE 10 MILLIGRAM(S): 10 TABLET ORAL at 05:36

## 2017-04-21 RX ADMIN — Medication 50 MILLIEQUIVALENT(S): at 14:28

## 2017-04-21 RX ADMIN — HEPARIN SODIUM 5000 UNIT(S): 5000 INJECTION INTRAVENOUS; SUBCUTANEOUS at 05:35

## 2017-04-21 RX ADMIN — Medication 1 DROP(S): at 05:36

## 2017-04-21 RX ADMIN — AMLODIPINE BESYLATE 10 MILLIGRAM(S): 2.5 TABLET ORAL at 05:36

## 2017-04-21 RX ADMIN — HEPARIN SODIUM 5000 UNIT(S): 5000 INJECTION INTRAVENOUS; SUBCUTANEOUS at 17:09

## 2017-04-21 RX ADMIN — CEFEPIME 100 MILLIGRAM(S): 1 INJECTION, POWDER, FOR SOLUTION INTRAMUSCULAR; INTRAVENOUS at 17:53

## 2017-04-21 RX ADMIN — SODIUM CHLORIDE 50 MILLILITER(S): 9 INJECTION, SOLUTION INTRAVENOUS at 17:00

## 2017-04-21 RX ADMIN — LATANOPROST 1 DROP(S): 0.05 SOLUTION/ DROPS OPHTHALMIC; TOPICAL at 22:14

## 2017-04-21 RX ADMIN — CEFEPIME 100 MILLIGRAM(S): 1 INJECTION, POWDER, FOR SOLUTION INTRAMUSCULAR; INTRAVENOUS at 05:36

## 2017-04-21 RX ADMIN — Medication 100 MILLIEQUIVALENT(S): at 18:28

## 2017-04-21 RX ADMIN — Medication 5 MILLIGRAM(S): at 00:19

## 2017-04-21 RX ADMIN — SODIUM CHLORIDE 50 MILLILITER(S): 9 INJECTION, SOLUTION INTRAVENOUS at 19:48

## 2017-04-21 RX ADMIN — SODIUM CHLORIDE 50 MILLILITER(S): 9 INJECTION, SOLUTION INTRAVENOUS at 05:36

## 2017-04-22 LAB
ALBUMIN SERPL ELPH-MCNC: 2.4 G/DL — LOW (ref 3.3–5)
ALBUMIN SERPL ELPH-MCNC: 2.6 G/DL — LOW (ref 3.3–5)
ALP SERPL-CCNC: 82 U/L — SIGNIFICANT CHANGE UP (ref 40–120)
ALP SERPL-CCNC: 83 U/L — SIGNIFICANT CHANGE UP (ref 40–120)
ALT FLD-CCNC: 23 U/L RC — SIGNIFICANT CHANGE UP (ref 10–45)
ALT FLD-CCNC: 23 U/L RC — SIGNIFICANT CHANGE UP (ref 10–45)
ANION GAP SERPL CALC-SCNC: 13 MMOL/L — SIGNIFICANT CHANGE UP (ref 5–17)
ANION GAP SERPL CALC-SCNC: 8 MMOL/L — SIGNIFICANT CHANGE UP (ref 5–17)
AST SERPL-CCNC: 26 U/L — SIGNIFICANT CHANGE UP (ref 10–40)
AST SERPL-CCNC: 27 U/L — SIGNIFICANT CHANGE UP (ref 10–40)
BASOPHILS # BLD AUTO: 0 K/UL — SIGNIFICANT CHANGE UP (ref 0–0.2)
BASOPHILS NFR BLD AUTO: 0.1 % — SIGNIFICANT CHANGE UP (ref 0–2)
BILIRUB SERPL-MCNC: 0.2 MG/DL — SIGNIFICANT CHANGE UP (ref 0.2–1.2)
BILIRUB SERPL-MCNC: 0.3 MG/DL — SIGNIFICANT CHANGE UP (ref 0.2–1.2)
BUN SERPL-MCNC: 15 MG/DL — SIGNIFICANT CHANGE UP (ref 7–23)
BUN SERPL-MCNC: 19 MG/DL — SIGNIFICANT CHANGE UP (ref 7–23)
CALCIUM SERPL-MCNC: 7.8 MG/DL — LOW (ref 8.4–10.5)
CALCIUM SERPL-MCNC: 8 MG/DL — LOW (ref 8.4–10.5)
CHLORIDE SERPL-SCNC: 105 MMOL/L — SIGNIFICANT CHANGE UP (ref 96–108)
CHLORIDE SERPL-SCNC: 107 MMOL/L — SIGNIFICANT CHANGE UP (ref 96–108)
CO2 SERPL-SCNC: 27 MMOL/L — SIGNIFICANT CHANGE UP (ref 22–31)
CO2 SERPL-SCNC: 31 MMOL/L — SIGNIFICANT CHANGE UP (ref 22–31)
CREAT SERPL-MCNC: 0.74 MG/DL — SIGNIFICANT CHANGE UP (ref 0.5–1.3)
CREAT SERPL-MCNC: 0.82 MG/DL — SIGNIFICANT CHANGE UP (ref 0.5–1.3)
EOSINOPHIL # BLD AUTO: 0.1 K/UL — SIGNIFICANT CHANGE UP (ref 0–0.5)
EOSINOPHIL NFR BLD AUTO: 1.2 % — SIGNIFICANT CHANGE UP (ref 0–6)
GLUCOSE SERPL-MCNC: 109 MG/DL — HIGH (ref 70–99)
GLUCOSE SERPL-MCNC: 140 MG/DL — HIGH (ref 70–99)
HCT VFR BLD CALC: 29.6 % — LOW (ref 39–50)
HGB BLD-MCNC: 10.1 G/DL — LOW (ref 13–17)
LYMPHOCYTES # BLD AUTO: 1.8 K/UL — SIGNIFICANT CHANGE UP (ref 1–3.3)
LYMPHOCYTES # BLD AUTO: 18.3 % — SIGNIFICANT CHANGE UP (ref 13–44)
MAGNESIUM SERPL-MCNC: 1.8 MG/DL — SIGNIFICANT CHANGE UP (ref 1.6–2.6)
MAGNESIUM SERPL-MCNC: 2.1 MG/DL — SIGNIFICANT CHANGE UP (ref 1.6–2.6)
MCHC RBC-ENTMCNC: 30.8 PG — SIGNIFICANT CHANGE UP (ref 27–34)
MCHC RBC-ENTMCNC: 34.1 GM/DL — SIGNIFICANT CHANGE UP (ref 32–36)
MCV RBC AUTO: 90.3 FL — SIGNIFICANT CHANGE UP (ref 80–100)
MONOCYTES # BLD AUTO: 0.6 K/UL — SIGNIFICANT CHANGE UP (ref 0–0.9)
MONOCYTES NFR BLD AUTO: 6.2 % — SIGNIFICANT CHANGE UP (ref 2–14)
NEUTROPHILS # BLD AUTO: 7.2 K/UL — SIGNIFICANT CHANGE UP (ref 1.8–7.4)
NEUTROPHILS NFR BLD AUTO: 74.1 % — SIGNIFICANT CHANGE UP (ref 43–77)
PHOSPHATE SERPL-MCNC: 2.3 MG/DL — LOW (ref 2.5–4.5)
PHOSPHATE SERPL-MCNC: 2.6 MG/DL — SIGNIFICANT CHANGE UP (ref 2.5–4.5)
PLATELET # BLD AUTO: 245 K/UL — SIGNIFICANT CHANGE UP (ref 150–400)
POTASSIUM SERPL-MCNC: 3.3 MMOL/L — LOW (ref 3.5–5.3)
POTASSIUM SERPL-MCNC: 3.9 MMOL/L — SIGNIFICANT CHANGE UP (ref 3.5–5.3)
POTASSIUM SERPL-SCNC: 3.3 MMOL/L — LOW (ref 3.5–5.3)
POTASSIUM SERPL-SCNC: 3.9 MMOL/L — SIGNIFICANT CHANGE UP (ref 3.5–5.3)
PROT SERPL-MCNC: 5.6 G/DL — LOW (ref 6–8.3)
PROT SERPL-MCNC: 5.6 G/DL — LOW (ref 6–8.3)
RBC # BLD: 3.28 M/UL — LOW (ref 4.2–5.8)
RBC # FLD: 13.4 % — SIGNIFICANT CHANGE UP (ref 10.3–14.5)
SODIUM SERPL-SCNC: 145 MMOL/L — SIGNIFICANT CHANGE UP (ref 135–145)
SODIUM SERPL-SCNC: 146 MMOL/L — HIGH (ref 135–145)
VANCOMYCIN TROUGH SERPL-MCNC: 6.5 UG/ML — LOW (ref 10–20)
WBC # BLD: 9.7 K/UL — SIGNIFICANT CHANGE UP (ref 3.8–10.5)
WBC # FLD AUTO: 9.7 K/UL — SIGNIFICANT CHANGE UP (ref 3.8–10.5)

## 2017-04-22 PROCEDURE — 99233 SBSQ HOSP IP/OBS HIGH 50: CPT | Mod: GC

## 2017-04-22 PROCEDURE — 93010 ELECTROCARDIOGRAM REPORT: CPT

## 2017-04-22 RX ORDER — POTASSIUM CHLORIDE 20 MEQ
20 PACKET (EA) ORAL ONCE
Qty: 0 | Refills: 0 | Status: COMPLETED | OUTPATIENT
Start: 2017-04-22 | End: 2017-04-22

## 2017-04-22 RX ORDER — SODIUM,POTASSIUM PHOSPHATES 278-250MG
1 POWDER IN PACKET (EA) ORAL ONCE
Qty: 0 | Refills: 0 | Status: COMPLETED | OUTPATIENT
Start: 2017-04-22 | End: 2017-04-22

## 2017-04-22 RX ORDER — POTASSIUM PHOSPHATE, MONOBASIC POTASSIUM PHOSPHATE, DIBASIC 236; 224 MG/ML; MG/ML
15 INJECTION, SOLUTION INTRAVENOUS ONCE
Qty: 0 | Refills: 0 | Status: COMPLETED | OUTPATIENT
Start: 2017-04-22 | End: 2017-04-22

## 2017-04-22 RX ORDER — LISINOPRIL 2.5 MG/1
2.5 TABLET ORAL DAILY
Qty: 0 | Refills: 0 | Status: DISCONTINUED | OUTPATIENT
Start: 2017-04-22 | End: 2017-04-25

## 2017-04-22 RX ORDER — SODIUM,POTASSIUM PHOSPHATES 278-250MG
1 POWDER IN PACKET (EA) ORAL
Qty: 0 | Refills: 0 | Status: DISCONTINUED | OUTPATIENT
Start: 2017-04-22 | End: 2017-04-22

## 2017-04-22 RX ORDER — VANCOMYCIN HCL 1 G
1250 VIAL (EA) INTRAVENOUS EVERY 12 HOURS
Qty: 0 | Refills: 0 | Status: DISCONTINUED | OUTPATIENT
Start: 2017-04-22 | End: 2017-04-24

## 2017-04-22 RX ORDER — SODIUM,POTASSIUM PHOSPHATES 278-250MG
1 POWDER IN PACKET (EA) ORAL
Qty: 0 | Refills: 0 | Status: COMPLETED | OUTPATIENT
Start: 2017-04-22 | End: 2017-04-23

## 2017-04-22 RX ORDER — POTASSIUM CHLORIDE 20 MEQ
20 PACKET (EA) ORAL
Qty: 0 | Refills: 0 | Status: COMPLETED | OUTPATIENT
Start: 2017-04-22 | End: 2017-04-22

## 2017-04-22 RX ORDER — MAGNESIUM SULFATE 500 MG/ML
2 VIAL (ML) INJECTION ONCE
Qty: 0 | Refills: 0 | Status: COMPLETED | OUTPATIENT
Start: 2017-04-22 | End: 2017-04-22

## 2017-04-22 RX ADMIN — HEPARIN SODIUM 5000 UNIT(S): 5000 INJECTION INTRAVENOUS; SUBCUTANEOUS at 05:07

## 2017-04-22 RX ADMIN — HEPARIN SODIUM 5000 UNIT(S): 5000 INJECTION INTRAVENOUS; SUBCUTANEOUS at 17:24

## 2017-04-22 RX ADMIN — LATANOPROST 1 DROP(S): 0.05 SOLUTION/ DROPS OPHTHALMIC; TOPICAL at 21:23

## 2017-04-22 RX ADMIN — CEFEPIME 100 MILLIGRAM(S): 1 INJECTION, POWDER, FOR SOLUTION INTRAMUSCULAR; INTRAVENOUS at 17:23

## 2017-04-22 RX ADMIN — Medication 166.67 MILLIGRAM(S): at 21:55

## 2017-04-22 RX ADMIN — Medication 50 GRAM(S): at 06:04

## 2017-04-22 RX ADMIN — Medication 50 MILLIEQUIVALENT(S): at 00:12

## 2017-04-22 RX ADMIN — Medication 50 MILLIEQUIVALENT(S): at 06:06

## 2017-04-22 RX ADMIN — MEMANTINE HYDROCHLORIDE 10 MILLIGRAM(S): 10 TABLET ORAL at 05:07

## 2017-04-22 RX ADMIN — LISINOPRIL 2.5 MILLIGRAM(S): 2.5 TABLET ORAL at 21:23

## 2017-04-22 RX ADMIN — Medication 100 MILLIEQUIVALENT(S): at 20:49

## 2017-04-22 RX ADMIN — Medication 1 TABLET(S): at 21:23

## 2017-04-22 RX ADMIN — MEMANTINE HYDROCHLORIDE 10 MILLIGRAM(S): 10 TABLET ORAL at 17:22

## 2017-04-22 RX ADMIN — POTASSIUM PHOSPHATE, MONOBASIC POTASSIUM PHOSPHATE, DIBASIC 62.5 MILLIMOLE(S): 236; 224 INJECTION, SOLUTION INTRAVENOUS at 06:05

## 2017-04-22 RX ADMIN — CEFEPIME 100 MILLIGRAM(S): 1 INJECTION, POWDER, FOR SOLUTION INTRAMUSCULAR; INTRAVENOUS at 05:07

## 2017-04-23 LAB
ALBUMIN SERPL ELPH-MCNC: 2.5 G/DL — LOW (ref 3.3–5)
ALP SERPL-CCNC: 79 U/L — SIGNIFICANT CHANGE UP (ref 40–120)
ALT FLD-CCNC: 21 U/L RC — SIGNIFICANT CHANGE UP (ref 10–45)
ANION GAP SERPL CALC-SCNC: 12 MMOL/L — SIGNIFICANT CHANGE UP (ref 5–17)
APTT BLD: 26.7 SEC — LOW (ref 27.5–37.4)
AST SERPL-CCNC: 21 U/L — SIGNIFICANT CHANGE UP (ref 10–40)
BASOPHILS # BLD AUTO: 0.1 K/UL — SIGNIFICANT CHANGE UP (ref 0–0.2)
BASOPHILS NFR BLD AUTO: 0.5 % — SIGNIFICANT CHANGE UP (ref 0–2)
BILIRUB SERPL-MCNC: 0.2 MG/DL — SIGNIFICANT CHANGE UP (ref 0.2–1.2)
BUN SERPL-MCNC: 15 MG/DL — SIGNIFICANT CHANGE UP (ref 7–23)
CALCIUM SERPL-MCNC: 7.9 MG/DL — LOW (ref 8.4–10.5)
CHLORIDE SERPL-SCNC: 104 MMOL/L — SIGNIFICANT CHANGE UP (ref 96–108)
CO2 SERPL-SCNC: 27 MMOL/L — SIGNIFICANT CHANGE UP (ref 22–31)
CREAT SERPL-MCNC: 0.68 MG/DL — SIGNIFICANT CHANGE UP (ref 0.5–1.3)
CULTURE RESULTS: SIGNIFICANT CHANGE UP
CULTURE RESULTS: SIGNIFICANT CHANGE UP
EOSINOPHIL # BLD AUTO: 0.2 K/UL — SIGNIFICANT CHANGE UP (ref 0–0.5)
EOSINOPHIL NFR BLD AUTO: 1.9 % — SIGNIFICANT CHANGE UP (ref 0–6)
GLUCOSE SERPL-MCNC: 99 MG/DL — SIGNIFICANT CHANGE UP (ref 70–99)
HCT VFR BLD CALC: 31.5 % — LOW (ref 39–50)
HGB BLD-MCNC: 10.4 G/DL — LOW (ref 13–17)
INR BLD: 1.29 RATIO — HIGH (ref 0.88–1.16)
LYMPHOCYTES # BLD AUTO: 1.9 K/UL — SIGNIFICANT CHANGE UP (ref 1–3.3)
LYMPHOCYTES # BLD AUTO: 19.6 % — SIGNIFICANT CHANGE UP (ref 13–44)
MAGNESIUM SERPL-MCNC: 2 MG/DL — SIGNIFICANT CHANGE UP (ref 1.6–2.6)
MCHC RBC-ENTMCNC: 29.8 PG — SIGNIFICANT CHANGE UP (ref 27–34)
MCHC RBC-ENTMCNC: 33 GM/DL — SIGNIFICANT CHANGE UP (ref 32–36)
MCV RBC AUTO: 90.5 FL — SIGNIFICANT CHANGE UP (ref 80–100)
MONOCYTES # BLD AUTO: 0.5 K/UL — SIGNIFICANT CHANGE UP (ref 0–0.9)
MONOCYTES NFR BLD AUTO: 4.6 % — SIGNIFICANT CHANGE UP (ref 2–14)
NEUTROPHILS # BLD AUTO: 7.2 K/UL — SIGNIFICANT CHANGE UP (ref 1.8–7.4)
NEUTROPHILS NFR BLD AUTO: 73.2 % — SIGNIFICANT CHANGE UP (ref 43–77)
PHOSPHATE SERPL-MCNC: 2.9 MG/DL — SIGNIFICANT CHANGE UP (ref 2.5–4.5)
PLATELET # BLD AUTO: 291 K/UL — SIGNIFICANT CHANGE UP (ref 150–400)
POTASSIUM SERPL-MCNC: 3.7 MMOL/L — SIGNIFICANT CHANGE UP (ref 3.5–5.3)
POTASSIUM SERPL-SCNC: 3.7 MMOL/L — SIGNIFICANT CHANGE UP (ref 3.5–5.3)
PROT SERPL-MCNC: 5.5 G/DL — LOW (ref 6–8.3)
PROTHROM AB SERPL-ACNC: 14.1 SEC — HIGH (ref 9.8–12.7)
RBC # BLD: 3.48 M/UL — LOW (ref 4.2–5.8)
RBC # FLD: 13.2 % — SIGNIFICANT CHANGE UP (ref 10.3–14.5)
SODIUM SERPL-SCNC: 143 MMOL/L — SIGNIFICANT CHANGE UP (ref 135–145)
SPECIMEN SOURCE: SIGNIFICANT CHANGE UP
SPECIMEN SOURCE: SIGNIFICANT CHANGE UP
WBC # BLD: 9.8 K/UL — SIGNIFICANT CHANGE UP (ref 3.8–10.5)
WBC # FLD AUTO: 9.8 K/UL — SIGNIFICANT CHANGE UP (ref 3.8–10.5)

## 2017-04-23 PROCEDURE — 99233 SBSQ HOSP IP/OBS HIGH 50: CPT | Mod: GC

## 2017-04-23 RX ORDER — CALCIUM GLUCONATE 100 MG/ML
1 VIAL (ML) INTRAVENOUS ONCE
Qty: 0 | Refills: 0 | Status: COMPLETED | OUTPATIENT
Start: 2017-04-23 | End: 2017-04-23

## 2017-04-23 RX ORDER — POTASSIUM CHLORIDE 20 MEQ
20 PACKET (EA) ORAL
Qty: 0 | Refills: 0 | Status: COMPLETED | OUTPATIENT
Start: 2017-04-23 | End: 2017-04-23

## 2017-04-23 RX ADMIN — HEPARIN SODIUM 5000 UNIT(S): 5000 INJECTION INTRAVENOUS; SUBCUTANEOUS at 17:22

## 2017-04-23 RX ADMIN — CEFEPIME 100 MILLIGRAM(S): 1 INJECTION, POWDER, FOR SOLUTION INTRAMUSCULAR; INTRAVENOUS at 05:00

## 2017-04-23 RX ADMIN — Medication 200 GRAM(S): at 06:39

## 2017-04-23 RX ADMIN — Medication 1 TABLET(S): at 05:58

## 2017-04-23 RX ADMIN — MEMANTINE HYDROCHLORIDE 10 MILLIGRAM(S): 10 TABLET ORAL at 17:22

## 2017-04-23 RX ADMIN — Medication 50 MILLIEQUIVALENT(S): at 07:20

## 2017-04-23 RX ADMIN — HEPARIN SODIUM 5000 UNIT(S): 5000 INJECTION INTRAVENOUS; SUBCUTANEOUS at 05:01

## 2017-04-23 RX ADMIN — Medication 1 TABLET(S): at 10:53

## 2017-04-23 RX ADMIN — Medication 50 MILLIEQUIVALENT(S): at 05:53

## 2017-04-23 RX ADMIN — LATANOPROST 1 DROP(S): 0.05 SOLUTION/ DROPS OPHTHALMIC; TOPICAL at 21:35

## 2017-04-23 RX ADMIN — MEMANTINE HYDROCHLORIDE 10 MILLIGRAM(S): 10 TABLET ORAL at 05:01

## 2017-04-23 RX ADMIN — Medication 166.67 MILLIGRAM(S): at 10:53

## 2017-04-23 RX ADMIN — Medication 1 TABLET(S): at 17:22

## 2017-04-23 RX ADMIN — LISINOPRIL 2.5 MILLIGRAM(S): 2.5 TABLET ORAL at 10:54

## 2017-04-23 RX ADMIN — Medication 166.67 MILLIGRAM(S): at 22:09

## 2017-04-23 RX ADMIN — CEFEPIME 100 MILLIGRAM(S): 1 INJECTION, POWDER, FOR SOLUTION INTRAMUSCULAR; INTRAVENOUS at 17:22

## 2017-04-24 ENCOUNTER — OTHER (OUTPATIENT)
Age: 82
End: 2017-04-24

## 2017-04-24 LAB
ALBUMIN SERPL ELPH-MCNC: 2.6 G/DL — LOW (ref 3.3–5)
ALP SERPL-CCNC: 81 U/L — SIGNIFICANT CHANGE UP (ref 40–120)
ALT FLD-CCNC: 22 U/L RC — SIGNIFICANT CHANGE UP (ref 10–45)
ANION GAP SERPL CALC-SCNC: 12 MMOL/L — SIGNIFICANT CHANGE UP (ref 5–17)
AST SERPL-CCNC: 23 U/L — SIGNIFICANT CHANGE UP (ref 10–40)
BASOPHILS # BLD AUTO: 0 K/UL — SIGNIFICANT CHANGE UP (ref 0–0.2)
BASOPHILS NFR BLD AUTO: 0.1 % — SIGNIFICANT CHANGE UP (ref 0–2)
BILIRUB SERPL-MCNC: 0.4 MG/DL — SIGNIFICANT CHANGE UP (ref 0.2–1.2)
BUN SERPL-MCNC: 12 MG/DL — SIGNIFICANT CHANGE UP (ref 7–23)
CALCIUM SERPL-MCNC: 8 MG/DL — LOW (ref 8.4–10.5)
CHLORIDE SERPL-SCNC: 101 MMOL/L — SIGNIFICANT CHANGE UP (ref 96–108)
CO2 SERPL-SCNC: 28 MMOL/L — SIGNIFICANT CHANGE UP (ref 22–31)
CREAT SERPL-MCNC: 0.69 MG/DL — SIGNIFICANT CHANGE UP (ref 0.5–1.3)
EOSINOPHIL # BLD AUTO: 0.2 K/UL — SIGNIFICANT CHANGE UP (ref 0–0.5)
EOSINOPHIL NFR BLD AUTO: 1.6 % — SIGNIFICANT CHANGE UP (ref 0–6)
GLUCOSE SERPL-MCNC: 102 MG/DL — HIGH (ref 70–99)
HCT VFR BLD CALC: 33.5 % — LOW (ref 39–50)
HGB BLD-MCNC: 11 G/DL — LOW (ref 13–17)
LYMPHOCYTES # BLD AUTO: 1.8 K/UL — SIGNIFICANT CHANGE UP (ref 1–3.3)
LYMPHOCYTES # BLD AUTO: 17 % — SIGNIFICANT CHANGE UP (ref 13–44)
MAGNESIUM SERPL-MCNC: 2 MG/DL — SIGNIFICANT CHANGE UP (ref 1.6–2.6)
MCHC RBC-ENTMCNC: 29.8 PG — SIGNIFICANT CHANGE UP (ref 27–34)
MCHC RBC-ENTMCNC: 33 GM/DL — SIGNIFICANT CHANGE UP (ref 32–36)
MCV RBC AUTO: 90.2 FL — SIGNIFICANT CHANGE UP (ref 80–100)
MONOCYTES # BLD AUTO: 0.6 K/UL — SIGNIFICANT CHANGE UP (ref 0–0.9)
MONOCYTES NFR BLD AUTO: 5.6 % — SIGNIFICANT CHANGE UP (ref 2–14)
NEUTROPHILS # BLD AUTO: 7.8 K/UL — HIGH (ref 1.8–7.4)
NEUTROPHILS NFR BLD AUTO: 75.6 % — SIGNIFICANT CHANGE UP (ref 43–77)
PHOSPHATE SERPL-MCNC: 3.4 MG/DL — SIGNIFICANT CHANGE UP (ref 2.5–4.5)
PLATELET # BLD AUTO: 321 K/UL — SIGNIFICANT CHANGE UP (ref 150–400)
POTASSIUM SERPL-MCNC: 3.7 MMOL/L — SIGNIFICANT CHANGE UP (ref 3.5–5.3)
POTASSIUM SERPL-SCNC: 3.7 MMOL/L — SIGNIFICANT CHANGE UP (ref 3.5–5.3)
PROT SERPL-MCNC: 5.7 G/DL — LOW (ref 6–8.3)
RBC # BLD: 3.71 M/UL — LOW (ref 4.2–5.8)
RBC # FLD: 13.4 % — SIGNIFICANT CHANGE UP (ref 10.3–14.5)
SODIUM SERPL-SCNC: 141 MMOL/L — SIGNIFICANT CHANGE UP (ref 135–145)
VANCOMYCIN TROUGH SERPL-MCNC: 26 UG/ML — CRITICAL HIGH (ref 10–20)
WBC # BLD: 10.3 K/UL — SIGNIFICANT CHANGE UP (ref 3.8–10.5)
WBC # FLD AUTO: 10.3 K/UL — SIGNIFICANT CHANGE UP (ref 3.8–10.5)

## 2017-04-24 PROCEDURE — 99233 SBSQ HOSP IP/OBS HIGH 50: CPT

## 2017-04-24 PROCEDURE — 99233 SBSQ HOSP IP/OBS HIGH 50: CPT | Mod: GC

## 2017-04-24 PROCEDURE — 99232 SBSQ HOSP IP/OBS MODERATE 35: CPT

## 2017-04-24 PROCEDURE — 93010 ELECTROCARDIOGRAM REPORT: CPT

## 2017-04-24 RX ORDER — POTASSIUM CHLORIDE 20 MEQ
20 PACKET (EA) ORAL
Qty: 0 | Refills: 0 | Status: COMPLETED | OUTPATIENT
Start: 2017-04-24 | End: 2017-04-24

## 2017-04-24 RX ORDER — VANCOMYCIN HCL 1 G
750 VIAL (EA) INTRAVENOUS EVERY 12 HOURS
Qty: 0 | Refills: 0 | Status: DISCONTINUED | OUTPATIENT
Start: 2017-04-24 | End: 2017-04-28

## 2017-04-24 RX ADMIN — HEPARIN SODIUM 5000 UNIT(S): 5000 INJECTION INTRAVENOUS; SUBCUTANEOUS at 18:32

## 2017-04-24 RX ADMIN — Medication 50 MILLIEQUIVALENT(S): at 07:02

## 2017-04-24 RX ADMIN — Medication 150 MILLIGRAM(S): at 22:32

## 2017-04-24 RX ADMIN — Medication 166.67 MILLIGRAM(S): at 10:28

## 2017-04-24 RX ADMIN — LATANOPROST 1 DROP(S): 0.05 SOLUTION/ DROPS OPHTHALMIC; TOPICAL at 22:33

## 2017-04-24 RX ADMIN — HEPARIN SODIUM 5000 UNIT(S): 5000 INJECTION INTRAVENOUS; SUBCUTANEOUS at 05:37

## 2017-04-24 RX ADMIN — CEFEPIME 100 MILLIGRAM(S): 1 INJECTION, POWDER, FOR SOLUTION INTRAMUSCULAR; INTRAVENOUS at 05:36

## 2017-04-24 RX ADMIN — MEMANTINE HYDROCHLORIDE 10 MILLIGRAM(S): 10 TABLET ORAL at 05:37

## 2017-04-24 RX ADMIN — CEFEPIME 100 MILLIGRAM(S): 1 INJECTION, POWDER, FOR SOLUTION INTRAMUSCULAR; INTRAVENOUS at 18:31

## 2017-04-24 RX ADMIN — MEMANTINE HYDROCHLORIDE 10 MILLIGRAM(S): 10 TABLET ORAL at 18:32

## 2017-04-24 RX ADMIN — LISINOPRIL 2.5 MILLIGRAM(S): 2.5 TABLET ORAL at 05:37

## 2017-04-24 RX ADMIN — Medication 50 MILLIEQUIVALENT(S): at 05:38

## 2017-04-24 NOTE — PROVIDER CONTACT NOTE (CRITICAL VALUE NOTIFICATION) - RECOMMENDATIONS
Hold the next dose of vancomyocin
Patient currently on meropeneum
Pt is on meropenem 1g q12 hour IVSS
continue abx
continue meropeneum
treat blood culture growth
treat anaerobic gram variable rods

## 2017-04-24 NOTE — PROVIDER CONTACT NOTE (CRITICAL VALUE NOTIFICATION) - ACTION/TREATMENT ORDERED:
continue abx treatment as ordered
As per MD Thiago Sheets
Hold the next dose of vancomyocin
Meropenum given as ordered.
continue abx
continue abx as ordered
pt is on meropenem q12 hours

## 2017-04-24 NOTE — PROVIDER CONTACT NOTE (CRITICAL VALUE NOTIFICATION) - TEST AND RESULT REPORTED:
Blood culture Drawn 4/16/2017, noted gram negative rods in both aerobic and anerobic  bottles
Vancomyocin trough 26
blood culture anaerobic bottle from 4/16/2017 gram negative rods
blood culture growth anaerobic bottle gram variable rods from 4/16
blood cultures from 4/16 shows anaerobic gram variable rods
Blood cultures drawn on 4/16/2017, results indicate gram negative rods in aerobic and anerobic bottles.
Blood cultures preliminary results. positive growth in anaerobic bottle, gram negative rods

## 2017-04-25 LAB
ALBUMIN SERPL ELPH-MCNC: 2.7 G/DL — LOW (ref 3.3–5)
ALP SERPL-CCNC: 81 U/L — SIGNIFICANT CHANGE UP (ref 40–120)
ALT FLD-CCNC: 21 U/L RC — SIGNIFICANT CHANGE UP (ref 10–45)
ANION GAP SERPL CALC-SCNC: 11 MMOL/L — SIGNIFICANT CHANGE UP (ref 5–17)
APTT BLD: 29.7 SEC — SIGNIFICANT CHANGE UP (ref 27.5–37.4)
AST SERPL-CCNC: 23 U/L — SIGNIFICANT CHANGE UP (ref 10–40)
BASOPHILS # BLD AUTO: 0 K/UL — SIGNIFICANT CHANGE UP (ref 0–0.2)
BASOPHILS NFR BLD AUTO: 0.2 % — SIGNIFICANT CHANGE UP (ref 0–2)
BILIRUB SERPL-MCNC: 0.4 MG/DL — SIGNIFICANT CHANGE UP (ref 0.2–1.2)
BUN SERPL-MCNC: 9 MG/DL — SIGNIFICANT CHANGE UP (ref 7–23)
CALCIUM SERPL-MCNC: 8.2 MG/DL — LOW (ref 8.4–10.5)
CHLORIDE SERPL-SCNC: 99 MMOL/L — SIGNIFICANT CHANGE UP (ref 96–108)
CK MB CFR SERPL CALC: 1.5 NG/ML — SIGNIFICANT CHANGE UP (ref 0–6.7)
CK SERPL-CCNC: 20 U/L — LOW (ref 30–200)
CO2 SERPL-SCNC: 31 MMOL/L — SIGNIFICANT CHANGE UP (ref 22–31)
CREAT SERPL-MCNC: 0.76 MG/DL — SIGNIFICANT CHANGE UP (ref 0.5–1.3)
EOSINOPHIL # BLD AUTO: 0.2 K/UL — SIGNIFICANT CHANGE UP (ref 0–0.5)
EOSINOPHIL NFR BLD AUTO: 1.8 % — SIGNIFICANT CHANGE UP (ref 0–6)
GLUCOSE SERPL-MCNC: 94 MG/DL — SIGNIFICANT CHANGE UP (ref 70–99)
HCT VFR BLD CALC: 33.5 % — LOW (ref 39–50)
HGB BLD-MCNC: 10.9 G/DL — LOW (ref 13–17)
INR BLD: 1.32 RATIO — HIGH (ref 0.88–1.16)
LYMPHOCYTES # BLD AUTO: 19.9 % — SIGNIFICANT CHANGE UP (ref 13–44)
LYMPHOCYTES # BLD AUTO: 2 K/UL — SIGNIFICANT CHANGE UP (ref 1–3.3)
MAGNESIUM SERPL-MCNC: 2 MG/DL — SIGNIFICANT CHANGE UP (ref 1.6–2.6)
MCHC RBC-ENTMCNC: 29.4 PG — SIGNIFICANT CHANGE UP (ref 27–34)
MCHC RBC-ENTMCNC: 32.5 GM/DL — SIGNIFICANT CHANGE UP (ref 32–36)
MCV RBC AUTO: 90.5 FL — SIGNIFICANT CHANGE UP (ref 80–100)
MONOCYTES # BLD AUTO: 0.6 K/UL — SIGNIFICANT CHANGE UP (ref 0–0.9)
MONOCYTES NFR BLD AUTO: 5.8 % — SIGNIFICANT CHANGE UP (ref 2–14)
NEUTROPHILS # BLD AUTO: 7.3 K/UL — SIGNIFICANT CHANGE UP (ref 1.8–7.4)
NEUTROPHILS NFR BLD AUTO: 72.3 % — SIGNIFICANT CHANGE UP (ref 43–77)
PHOSPHATE SERPL-MCNC: 3.4 MG/DL — SIGNIFICANT CHANGE UP (ref 2.5–4.5)
PLATELET # BLD AUTO: 349 K/UL — SIGNIFICANT CHANGE UP (ref 150–400)
POTASSIUM SERPL-MCNC: 3.8 MMOL/L — SIGNIFICANT CHANGE UP (ref 3.5–5.3)
POTASSIUM SERPL-SCNC: 3.8 MMOL/L — SIGNIFICANT CHANGE UP (ref 3.5–5.3)
PROT SERPL-MCNC: 5.9 G/DL — LOW (ref 6–8.3)
PROTHROM AB SERPL-ACNC: 14.5 SEC — HIGH (ref 9.8–12.7)
RBC # BLD: 3.7 M/UL — LOW (ref 4.2–5.8)
RBC # FLD: 13.3 % — SIGNIFICANT CHANGE UP (ref 10.3–14.5)
SODIUM SERPL-SCNC: 141 MMOL/L — SIGNIFICANT CHANGE UP (ref 135–145)
TROPONIN T SERPL-MCNC: 0.02 NG/ML — SIGNIFICANT CHANGE UP (ref 0–0.06)
WBC # BLD: 10 K/UL — SIGNIFICANT CHANGE UP (ref 3.8–10.5)
WBC # FLD AUTO: 10 K/UL — SIGNIFICANT CHANGE UP (ref 3.8–10.5)

## 2017-04-25 PROCEDURE — 93010 ELECTROCARDIOGRAM REPORT: CPT

## 2017-04-25 PROCEDURE — 99232 SBSQ HOSP IP/OBS MODERATE 35: CPT

## 2017-04-25 PROCEDURE — 99233 SBSQ HOSP IP/OBS HIGH 50: CPT

## 2017-04-25 PROCEDURE — 99233 SBSQ HOSP IP/OBS HIGH 50: CPT | Mod: GC

## 2017-04-25 RX ORDER — LISINOPRIL 2.5 MG/1
5 TABLET ORAL DAILY
Qty: 0 | Refills: 0 | Status: DISCONTINUED | OUTPATIENT
Start: 2017-04-25 | End: 2017-04-27

## 2017-04-25 RX ORDER — POTASSIUM CHLORIDE 20 MEQ
20 PACKET (EA) ORAL ONCE
Qty: 0 | Refills: 0 | Status: COMPLETED | OUTPATIENT
Start: 2017-04-25 | End: 2017-04-25

## 2017-04-25 RX ADMIN — LATANOPROST 1 DROP(S): 0.05 SOLUTION/ DROPS OPHTHALMIC; TOPICAL at 21:04

## 2017-04-25 RX ADMIN — MEMANTINE HYDROCHLORIDE 10 MILLIGRAM(S): 10 TABLET ORAL at 17:55

## 2017-04-25 RX ADMIN — HEPARIN SODIUM 5000 UNIT(S): 5000 INJECTION INTRAVENOUS; SUBCUTANEOUS at 18:32

## 2017-04-25 RX ADMIN — HEPARIN SODIUM 5000 UNIT(S): 5000 INJECTION INTRAVENOUS; SUBCUTANEOUS at 05:03

## 2017-04-25 RX ADMIN — LISINOPRIL 5 MILLIGRAM(S): 2.5 TABLET ORAL at 17:56

## 2017-04-25 RX ADMIN — CEFEPIME 100 MILLIGRAM(S): 1 INJECTION, POWDER, FOR SOLUTION INTRAMUSCULAR; INTRAVENOUS at 17:55

## 2017-04-25 RX ADMIN — LISINOPRIL 2.5 MILLIGRAM(S): 2.5 TABLET ORAL at 05:03

## 2017-04-25 RX ADMIN — CEFEPIME 100 MILLIGRAM(S): 1 INJECTION, POWDER, FOR SOLUTION INTRAMUSCULAR; INTRAVENOUS at 05:02

## 2017-04-25 RX ADMIN — MEMANTINE HYDROCHLORIDE 10 MILLIGRAM(S): 10 TABLET ORAL at 05:03

## 2017-04-25 RX ADMIN — Medication 20 MILLIEQUIVALENT(S): at 05:02

## 2017-04-26 LAB
ANION GAP SERPL CALC-SCNC: 11 MMOL/L — SIGNIFICANT CHANGE UP (ref 5–17)
BUN SERPL-MCNC: 9 MG/DL — SIGNIFICANT CHANGE UP (ref 7–23)
CALCIUM SERPL-MCNC: 8.4 MG/DL — SIGNIFICANT CHANGE UP (ref 8.4–10.5)
CHLORIDE SERPL-SCNC: 101 MMOL/L — SIGNIFICANT CHANGE UP (ref 96–108)
CO2 SERPL-SCNC: 30 MMOL/L — SIGNIFICANT CHANGE UP (ref 22–31)
CREAT SERPL-MCNC: 0.67 MG/DL — SIGNIFICANT CHANGE UP (ref 0.5–1.3)
GLUCOSE SERPL-MCNC: 101 MG/DL — HIGH (ref 70–99)
HCT VFR BLD CALC: 35.3 % — LOW (ref 39–50)
HGB BLD-MCNC: 11.1 G/DL — LOW (ref 13–17)
MCHC RBC-ENTMCNC: 28.5 PG — SIGNIFICANT CHANGE UP (ref 27–34)
MCHC RBC-ENTMCNC: 31.4 GM/DL — LOW (ref 32–36)
MCV RBC AUTO: 90.7 FL — SIGNIFICANT CHANGE UP (ref 80–100)
PLATELET # BLD AUTO: 372 K/UL — SIGNIFICANT CHANGE UP (ref 150–400)
POTASSIUM SERPL-MCNC: 3.7 MMOL/L — SIGNIFICANT CHANGE UP (ref 3.5–5.3)
POTASSIUM SERPL-SCNC: 3.7 MMOL/L — SIGNIFICANT CHANGE UP (ref 3.5–5.3)
RBC # BLD: 3.89 M/UL — LOW (ref 4.2–5.8)
RBC # FLD: 14.8 % — HIGH (ref 10.3–14.5)
SODIUM SERPL-SCNC: 142 MMOL/L — SIGNIFICANT CHANGE UP (ref 135–145)
VANCOMYCIN TROUGH SERPL-MCNC: 15.4 UG/ML — SIGNIFICANT CHANGE UP (ref 10–20)
WBC # BLD: 11.71 K/UL — HIGH (ref 3.8–10.5)
WBC # FLD AUTO: 11.71 K/UL — HIGH (ref 3.8–10.5)

## 2017-04-26 PROCEDURE — 99233 SBSQ HOSP IP/OBS HIGH 50: CPT | Mod: GC

## 2017-04-26 PROCEDURE — 99232 SBSQ HOSP IP/OBS MODERATE 35: CPT

## 2017-04-26 RX ADMIN — MEMANTINE HYDROCHLORIDE 10 MILLIGRAM(S): 10 TABLET ORAL at 05:05

## 2017-04-26 RX ADMIN — CEFEPIME 100 MILLIGRAM(S): 1 INJECTION, POWDER, FOR SOLUTION INTRAMUSCULAR; INTRAVENOUS at 16:47

## 2017-04-26 RX ADMIN — HEPARIN SODIUM 5000 UNIT(S): 5000 INJECTION INTRAVENOUS; SUBCUTANEOUS at 05:05

## 2017-04-26 RX ADMIN — Medication 150 MILLIGRAM(S): at 10:08

## 2017-04-26 RX ADMIN — LISINOPRIL 5 MILLIGRAM(S): 2.5 TABLET ORAL at 05:05

## 2017-04-26 RX ADMIN — Medication 150 MILLIGRAM(S): at 21:19

## 2017-04-26 RX ADMIN — MEMANTINE HYDROCHLORIDE 10 MILLIGRAM(S): 10 TABLET ORAL at 16:46

## 2017-04-26 RX ADMIN — LATANOPROST 1 DROP(S): 0.05 SOLUTION/ DROPS OPHTHALMIC; TOPICAL at 21:19

## 2017-04-26 RX ADMIN — CEFEPIME 100 MILLIGRAM(S): 1 INJECTION, POWDER, FOR SOLUTION INTRAMUSCULAR; INTRAVENOUS at 05:05

## 2017-04-26 RX ADMIN — HEPARIN SODIUM 5000 UNIT(S): 5000 INJECTION INTRAVENOUS; SUBCUTANEOUS at 16:47

## 2017-04-27 LAB
HCT VFR BLD CALC: 33.6 % — LOW (ref 39–50)
HGB BLD-MCNC: 10.5 G/DL — LOW (ref 13–17)
MCHC RBC-ENTMCNC: 28.1 PG — SIGNIFICANT CHANGE UP (ref 27–34)
MCHC RBC-ENTMCNC: 31.3 GM/DL — LOW (ref 32–36)
MCV RBC AUTO: 89.8 FL — SIGNIFICANT CHANGE UP (ref 80–100)
PLATELET # BLD AUTO: 379 K/UL — SIGNIFICANT CHANGE UP (ref 150–400)
RBC # BLD: 3.74 M/UL — LOW (ref 4.2–5.8)
RBC # FLD: 15 % — HIGH (ref 10.3–14.5)
WBC # BLD: 10.8 K/UL — HIGH (ref 3.8–10.5)
WBC # FLD AUTO: 10.8 K/UL — HIGH (ref 3.8–10.5)

## 2017-04-27 PROCEDURE — 99233 SBSQ HOSP IP/OBS HIGH 50: CPT | Mod: GC

## 2017-04-27 RX ORDER — LISINOPRIL 2.5 MG/1
10 TABLET ORAL DAILY
Qty: 0 | Refills: 0 | Status: DISCONTINUED | OUTPATIENT
Start: 2017-04-28 | End: 2017-05-01

## 2017-04-27 RX ORDER — LISINOPRIL 2.5 MG/1
5 TABLET ORAL ONCE
Qty: 0 | Refills: 0 | Status: COMPLETED | OUTPATIENT
Start: 2017-04-27 | End: 2017-04-27

## 2017-04-27 RX ORDER — VANCOMYCIN HCL 1 G
750 VIAL (EA) INTRAVENOUS
Qty: 14 | Refills: 0 | OUTPATIENT
Start: 2017-04-27 | End: 2017-05-11

## 2017-04-27 RX ADMIN — CEFEPIME 100 MILLIGRAM(S): 1 INJECTION, POWDER, FOR SOLUTION INTRAMUSCULAR; INTRAVENOUS at 05:03

## 2017-04-27 RX ADMIN — HEPARIN SODIUM 5000 UNIT(S): 5000 INJECTION INTRAVENOUS; SUBCUTANEOUS at 17:19

## 2017-04-27 RX ADMIN — Medication 150 MILLIGRAM(S): at 22:11

## 2017-04-27 RX ADMIN — LISINOPRIL 5 MILLIGRAM(S): 2.5 TABLET ORAL at 05:03

## 2017-04-27 RX ADMIN — HEPARIN SODIUM 5000 UNIT(S): 5000 INJECTION INTRAVENOUS; SUBCUTANEOUS at 05:03

## 2017-04-27 RX ADMIN — LATANOPROST 1 DROP(S): 0.05 SOLUTION/ DROPS OPHTHALMIC; TOPICAL at 22:11

## 2017-04-27 RX ADMIN — Medication 150 MILLIGRAM(S): at 11:43

## 2017-04-27 RX ADMIN — LISINOPRIL 5 MILLIGRAM(S): 2.5 TABLET ORAL at 11:43

## 2017-04-27 RX ADMIN — CEFEPIME 100 MILLIGRAM(S): 1 INJECTION, POWDER, FOR SOLUTION INTRAMUSCULAR; INTRAVENOUS at 17:18

## 2017-04-27 RX ADMIN — MEMANTINE HYDROCHLORIDE 10 MILLIGRAM(S): 10 TABLET ORAL at 17:19

## 2017-04-27 RX ADMIN — MEMANTINE HYDROCHLORIDE 10 MILLIGRAM(S): 10 TABLET ORAL at 05:03

## 2017-04-28 ENCOUNTER — TRANSCRIPTION ENCOUNTER (OUTPATIENT)
Age: 82
End: 2017-04-28

## 2017-04-28 LAB — VANCOMYCIN TROUGH SERPL-MCNC: 22.3 UG/ML — HIGH (ref 10–20)

## 2017-04-28 PROCEDURE — 99232 SBSQ HOSP IP/OBS MODERATE 35: CPT

## 2017-04-28 PROCEDURE — 99233 SBSQ HOSP IP/OBS HIGH 50: CPT | Mod: GC

## 2017-04-28 RX ORDER — OLANZAPINE 15 MG/1
1 TABLET, FILM COATED ORAL
Qty: 0 | Refills: 0 | COMMUNITY

## 2017-04-28 RX ORDER — TAMSULOSIN HYDROCHLORIDE 0.4 MG/1
1 CAPSULE ORAL
Qty: 0 | Refills: 0 | COMMUNITY

## 2017-04-28 RX ORDER — LISINOPRIL 2.5 MG/1
1 TABLET ORAL
Qty: 30 | Refills: 0 | OUTPATIENT
Start: 2017-04-28 | End: 2017-05-28

## 2017-04-28 RX ORDER — VANCOMYCIN HCL 1 G
1000 VIAL (EA) INTRAVENOUS EVERY 24 HOURS
Qty: 0 | Refills: 0 | Status: DISCONTINUED | OUTPATIENT
Start: 2017-04-28 | End: 2017-05-01

## 2017-04-28 RX ORDER — TIMOLOL 0.5 %
1 DROPS OPHTHALMIC (EYE)
Qty: 0 | Refills: 0 | COMMUNITY

## 2017-04-28 RX ORDER — DONEPEZIL HYDROCHLORIDE 10 MG/1
1 TABLET, FILM COATED ORAL
Qty: 0 | Refills: 0 | COMMUNITY

## 2017-04-28 RX ADMIN — MEMANTINE HYDROCHLORIDE 10 MILLIGRAM(S): 10 TABLET ORAL at 17:53

## 2017-04-28 RX ADMIN — LATANOPROST 1 DROP(S): 0.05 SOLUTION/ DROPS OPHTHALMIC; TOPICAL at 21:51

## 2017-04-28 RX ADMIN — HEPARIN SODIUM 5000 UNIT(S): 5000 INJECTION INTRAVENOUS; SUBCUTANEOUS at 17:52

## 2017-04-28 RX ADMIN — CEFEPIME 100 MILLIGRAM(S): 1 INJECTION, POWDER, FOR SOLUTION INTRAMUSCULAR; INTRAVENOUS at 05:03

## 2017-04-28 RX ADMIN — CEFEPIME 100 MILLIGRAM(S): 1 INJECTION, POWDER, FOR SOLUTION INTRAMUSCULAR; INTRAVENOUS at 19:50

## 2017-04-28 RX ADMIN — HEPARIN SODIUM 5000 UNIT(S): 5000 INJECTION INTRAVENOUS; SUBCUTANEOUS at 05:03

## 2017-04-28 RX ADMIN — MEMANTINE HYDROCHLORIDE 10 MILLIGRAM(S): 10 TABLET ORAL at 05:03

## 2017-04-28 RX ADMIN — LISINOPRIL 10 MILLIGRAM(S): 2.5 TABLET ORAL at 05:03

## 2017-04-28 NOTE — DISCHARGE NOTE ADULT - CARE PLAN
Principal Discharge DX:	Bacteremia  Goal:	Treatment  Instructions for follow-up, activity and diet:	Blood cultures from 4/16 were positive for Proteus mirabilis and Staphylococcus lugdunensis. Proteius mirabilis bacteremia treated inpatient with 2 weeks of cefepime. Staph bacteremia was concerning for endocarditis and TTE performed was inconclusive. LAURA was not able to be performed due to inability to cooperate with exam. Decision was made with ID to continue empiric treatment of endocarditis with vancomycin for 4 weeks. You will be discharged with a PICC line in the right upper extremity and vancomycin will finish 4/13/17 for a total 4 week course.  Secondary Diagnosis:	Bradycardia  Instructions for follow-up, activity and diet:	You were admitted to the CCU during this hospitalization for bradycardia. Workup was ongoing for pacemaker however bradycardia improved after discontinuation of timolol eye drops and aricept. Please do not resume these medications. Please follow up with your cardiologist upon discharge.  Secondary Diagnosis:	HTN (hypertension)  Instructions for follow-up, activity and diet:	Your lisinopril was increased to 10 mg as your blood pressure was high please continue this medication and follow up with your PMD after discharge.  Secondary Diagnosis:	Glaucoma  Instructions for follow-up, activity and diet:	Please continue using your eye drops as directed and follow up with your ophthalmologist  Secondary Diagnosis:	Afib  Instructions for follow-up, activity and diet:	Please do not use any betablockers or medications acting on the AV node as your course was complicated by bradycardia. Please follow up with your cardiologist. Principal Discharge DX:	Bacteremia  Goal:	Treatment  Instructions for follow-up, activity and diet:	Blood cultures from 4/16 were positive for Proteus mirabilis and Staphylococcus lugdunensis. Proteus mirabilis bacteremia treated inpatient with 2 weeks of cefepime. Staph bacteremia was concerning for endocarditis and TTE performed was inconclusive. LAURA was not able to be performed due to inability to cooperate with exam. Decision was made with ID to continue empiric treatment of endocarditis with vancomycin for 4 weeks. You will be discharged with a PICC line in the right upper extremity and vancomycin will finish 5/13/17 for a total 4 week course. Prescriptions for weekly CBC, CMP, and vancomycin trough have also been provided.  Secondary Diagnosis:	Bradycardia  Instructions for follow-up, activity and diet:	You were admitted to the CCU during this hospitalization for bradycardia. Workup was ongoing for pacemaker however bradycardia improved after discontinuation of timolol eye drops and aricept. Please do not resume these medications. Please follow up with your cardiologist upon discharge.  Secondary Diagnosis:	HTN (hypertension)  Instructions for follow-up, activity and diet:	Your lisinopril was increased to 10 mg as your blood pressure was high please continue this medication and follow up with your PMD after discharge.  Secondary Diagnosis:	Glaucoma  Instructions for follow-up, activity and diet:	Please continue using your eye drops as directed and follow up with your ophthalmologist  Secondary Diagnosis:	Afib  Instructions for follow-up, activity and diet:	Please do not use any betablockers or medications acting on the AV node as your course was complicated by bradycardia. Please follow up with your cardiologist. Principal Discharge DX:	Bacteremia  Goal:	Treatment  Instructions for follow-up, activity and diet:	Blood cultures from 4/16 were positive for Proteus mirabilis and Staphylococcus lugdunensis. Proteus mirabilis bacteremia treated inpatient with 2 weeks of cefepime. Staph bacteremia was concerning for endocarditis and TTE performed was inconclusive. LAURA was not able to be performed due to inability to cooperate with exam. Decision was made with ID to continue empiric treatment of endocarditis with vancomycin for 4 weeks. You will be discharged with a PICC line in the right upper extremity and vancomycin will finish 5/13/17 for a total 4 week course. Prescriptions for weekly CBC, CMP, and vancomycin trough have also been provided.  Secondary Diagnosis:	Bradycardia  Instructions for follow-up, activity and diet:	You were admitted to the CCU during this hospitalization for bradycardia. Workup was ongoing for pacemaker however bradycardia improved after discontinuation of timolol eye drops and aricept. Please do not resume these medications. Please follow up with your cardiologist upon discharge.  Secondary Diagnosis:	HTN (hypertension)  Instructions for follow-up, activity and diet:	Your lisinopril was increased to 10 mg as your blood pressure was high please continue this medication and follow up with your PMD after discharge.  Secondary Diagnosis:	Glaucoma  Instructions for follow-up, activity and diet:	Please continue using your eye drops as directed and follow up with your ophthalmologist  Secondary Diagnosis:	Afib  Instructions for follow-up, activity and diet:	Please do not use any betablockers or medications acting on the AV node as your course was complicated by bradycardia. Please follow up with your cardiologist.  Secondary Diagnosis:	Urinary retention  Instructions for follow-up, activity and diet:	You arrived to hospital with a chronic carlson and should follow up with your urologist upon discharge. Flomax was discontinued as you have use of chronic carlson catheter. Principal Discharge DX:	Sepsis  Goal:	Treatment  Instructions for follow-up, activity and diet:	Blood cultures from 4/16 were positive for Proteus mirabilis and Staphylococcus lugdunensis. Proteus mirabilis bacteremia treated inpatient with 2 weeks of cefepime. Staph bacteremia was concerning for endocarditis and TTE performed was inconclusive. LAURA was not able to be performed due to inability to cooperate with exam. Decision was made with ID to continue empiric treatment of endocarditis with vancomycin for 4 weeks. You will be discharged with a PICC line in the right upper extremity and vancomycin will finish 5/13/17 for a total 4 week course. Prescriptions for weekly CBC, CMP, and vancomycin trough have also been provided.  Secondary Diagnosis:	Bradycardia  Instructions for follow-up, activity and diet:	You were admitted to the CCU during this hospitalization for bradycardia. Workup was ongoing for pacemaker however bradycardia improved after discontinuation of timolol eye drops and aricept. Please do not resume these medications. Please follow up with your cardiologist upon discharge.  Secondary Diagnosis:	HTN (hypertension)  Instructions for follow-up, activity and diet:	Your lisinopril was increased to 10 mg as your blood pressure was high please continue this medication and follow up with your PMD after discharge.  Secondary Diagnosis:	Glaucoma  Instructions for follow-up, activity and diet:	Please continue using your eye drops as directed and follow up with your ophthalmologist  Secondary Diagnosis:	Afib  Instructions for follow-up, activity and diet:	Please do not use any betablockers or medications acting on the AV node as your course was complicated by bradycardia. Please follow up with your cardiologist.  Secondary Diagnosis:	Urinary retention  Instructions for follow-up, activity and diet:	You arrived to hospital with a chronic carlson and should follow up with your urologist upon discharge. Flomax was discontinued as you have use of chronic carlson catheter. Principal Discharge DX:	Sepsis  Goal:	Treatment  Instructions for follow-up, activity and diet:	Blood cultures from 4/16 were positive for Proteus mirabilis and Staphylococcus lugdunensis. Proteus mirabilis bacteremia treated inpatient with 2 weeks of cefepime. Staph bacteremia was concerning for endocarditis and TTE performed was inconclusive. LAURA was not able to be performed due to inability to cooperate with exam. Decision was made with ID to continue empiric treatment of endocarditis with vancomycin for 4 weeks. You will be discharged with a PICC line in the right upper extremity and vancomycin will finish 5/13/17 for a total 4 week course. Prescriptions for weekly CBC, CMP, and vancomycin trough have also been provided.  Secondary Diagnosis:	Bradycardia  Instructions for follow-up, activity and diet:	You were admitted to the CCU during this hospitalization for bradycardia. Workup was ongoing for pacemaker however bradycardia improved after discontinuation of timolol eye drops and aricept. Please do not resume these medications. Please follow up with your cardiologist upon discharge.  Secondary Diagnosis:	HTN (hypertension)  Instructions for follow-up, activity and diet:	Your lisinopril was increased to 10 mg as your blood pressure was high please continue this medication and follow up with your PMD after discharge.  Secondary Diagnosis:	Glaucoma  Instructions for follow-up, activity and diet:	Please continue using your eye drops as directed and follow up with your ophthalmologist  Secondary Diagnosis:	Afib  Instructions for follow-up, activity and diet:	Please do not use any beta blockers or medications acting on the AV node as your course was complicated by bradycardia. Please follow up with your cardiologist.  Secondary Diagnosis:	Urinary retention  Instructions for follow-up, activity and diet:	You arrived to hospital with a chronic carlson and should follow up with your urologist upon discharge. Flomax was discontinued as you have use of chronic carlson catheter.

## 2017-04-28 NOTE — DISCHARGE NOTE ADULT - PLAN OF CARE
Treatment Blood cultures from 4/16 were positive for Proteus mirabilis and Staphylococcus lugdunensis. Proteius mirabilis bacteremia treated inpatient with 2 weeks of cefepime. Staph bacteremia was concerning for endocarditis and TTE performed was inconclusive. LAURA was not able to be performed due to inability to cooperate with exam. Decision was made with ID to continue empiric treatment of endocarditis with vancomycin for 4 weeks. You will be discharged with a PICC line in the right upper extremity and vancomycin will finish 4/13/17 for a total 4 week course. You were admitted to the CCU during this hospitalization for bradycardia. Workup was ongoing for pacemaker however bradycardia improved after discontinuation of timolol eye drops and aricept. Please do not resume these medications. Please follow up with your cardiologist upon discharge. Your lisinopril was increased to 10 mg as your blood pressure was high please continue this medication and follow up with your PMD after discharge. Please continue using your eye drops as directed and follow up with your ophthalmologist Please do not use any betablockers or medications acting on the AV node as your course was complicated by bradycardia. Please follow up with your cardiologist. Blood cultures from 4/16 were positive for Proteus mirabilis and Staphylococcus lugdunensis. Proteus mirabilis bacteremia treated inpatient with 2 weeks of cefepime. Staph bacteremia was concerning for endocarditis and TTE performed was inconclusive. LAURA was not able to be performed due to inability to cooperate with exam. Decision was made with ID to continue empiric treatment of endocarditis with vancomycin for 4 weeks. You will be discharged with a PICC line in the right upper extremity and vancomycin will finish 5/13/17 for a total 4 week course. Prescriptions for weekly CBC, CMP, and vancomycin trough have also been provided. You arrived to hospital with a chronic carlson and should follow up with your urologist upon discharge. Flomax was discontinued as you have use of chronic carlson catheter. Please do not use any beta blockers or medications acting on the AV node as your course was complicated by bradycardia. Please follow up with your cardiologist.

## 2017-04-28 NOTE — DISCHARGE NOTE ADULT - HOME CARE AGENCY
A nurse will visit you in your home from [facility].  They will contact you to confirm their visit.  Call [phone] with any questions.

## 2017-04-28 NOTE — DISCHARGE NOTE ADULT - HOSPITAL COURSE
Patient is and 86 year old man with dementia, at baseline is alert and oriented to person only, paroxysmal Afib not on A/C, gout, HTN, chronic indwelling Barr catheter who presented with fevers and rigors. History had been obtained by patient's son Dr. Sotelo, oncologist. HE reported patient had abrupt onset of rigors and total body shaking late evening of admission. Otherwise, patient has been in his USOH. He suffers from advanced dementia. Patient's caretakers have not noticed any unusual events. He has a chronic indwelling Barr which was exchanged about three weeks prior to admission. He has had chronic Barr for the last nine months. He denied recent changes in medications. No sick contacts or exposures. Patient has had numerous ED and inpatient admissions for UTI sepsis in the past. In  ED Vitals: Tmax 102.6,  --> 67, -115/53-58, RR 17-18, O2 Sat 98-99 RA He received Tylenol, Meropenem x 1 dose, Potassium repletion, 500 cc NS bolus Blood cultures from 4/16 gres proteus mirabilis and staph lugdunensis. Pt had been started on vancomycin and cefepime was added on return of cx.     Staph was concerning for endocarditis and TTE was performed showing: EF 70%, 1. Thickened mitral valve that is incompletely visualized. Cannot rule out vegetations on the mitral valve.  Mild mitral regurgitation. Normal left ventricular internal dimensions and wall thicknesses. Normal left ventricular systolic function. No segmental wall motion abnormalities. Normal diastolic function normal right ventricular size and function. Cannot rule out endocarditis on the basis of this  technically difficult study. Suggest LAURA if clinical suspicion is high.    PICC was placed for long term antibiotics 4/23 in RUE.     LAURA attempted however patient was not able to tolerate the study. Empiric treatment suggested by ID and patient completed 2 weeks cefepime inpatient and will complete 4 weeks vancomycin by 5/13/17. Patient's son requiested cardiac MRI with sedation so patient cooperates with exam however on discussion with team and cardiology was determined that risk for sedating patient and doing cardiac MRI is higher than risk of doing IV abx.     Prior to discharge lisinopril was increased to 10 mg daily for hypertension. Plan to discharge patient home with PICC line with vancomycin until 5/13/17. Patient is and 86 year old man with dementia, at baseline is alert and oriented to person only, paroxysmal Afib not on A/C, gout, HTN, chronic indwelling Barr catheter who presented with fevers and rigors. History had been obtained by patient's son Dr. Sotelo, oncologist. HE reported patient had abrupt onset of rigors and total body shaking late evening of admission. Otherwise, patient has been in his USOH. He suffers from advanced dementia. Patient's caretakers have not noticed any unusual events. He has a chronic indwelling Barr which was exchanged about three weeks prior to admission. He has had chronic Barr for the last nine months. He denied recent changes in medications. No sick contacts or exposures. Patient has had numerous ED and inpatient admissions for UTI sepsis in the past. In  ED Vitals: Tmax 102.6,  --> 67, -115/53-58, RR 17-18, O2 Sat 98-99 RA He received Tylenol, Meropenem x 1 dose, Potassium repletion, 500 cc NS bolus Blood cultures from 4/16 gres proteus mirabilis and staph lugdunensis. Pt had been started on vancomycin and cefepime was added on return of cx.     Staph was concerning for endocarditis and TTE was performed showing: EF 70%, 1. Thickened mitral valve that is incompletely visualized. Cannot rule out vegetations on the mitral valve.  Mild mitral regurgitation. Normal left ventricular internal dimensions and wall thicknesses. Normal left ventricular systolic function. No segmental wall motion abnormalities. Normal diastolic function normal right ventricular size and function. Cannot rule out endocarditis on the basis of this  technically difficult study. Suggest LAURA if clinical suspicion is high.    PICC was placed for long term antibiotics 4/23 in RUE.     LAURA attempted however patient was not able to tolerate the study. Empiric treatment suggested by ID and patient completed 2 weeks cefepime inpatient and will complete 4 weeks vancomycin by 5/13/17. Patient's son requiested cardiac MRI with sedation so patient cooperates with exam however on discussion with team and cardiology was determined that risk for sedating patient and doing cardiac MRI is higher than risk of doing IV abx.     Prior to discharge lisinopril was increased to 10 mg daily for hypertension. Plan to discharge patient home with PICC line with vancomycin until 5/13/17. Pt to have weekly CBC, CMP and vancomycin trough with home vancomycin. Pt had therapeutic trough of 17.3 at 1g vanc daily prior to discharge. Patient is and 86 year old man with dementia, at baseline is alert and oriented to person only, paroxysmal Afib not on A/C, gout, HTN, chronic indwelling Barr catheter who presented with fevers and rigors. History had been obtained by patient's son Dr. Sotelo, oncologist. HE reported patient had abrupt onset of rigors and total body shaking late evening of admission. Otherwise, patient has been in his USOH. He suffers from advanced dementia. Patient's caretakers have not noticed any unusual events. He has a chronic indwelling Barr which was exchanged about three weeks prior to admission. He has had chronic Barr for the last nine months. He denied recent changes in medications. No sick contacts or exposures. Patient has had numerous ED and inpatient admissions for UTI sepsis in the past. In  ED Vitals: Tmax 102.6,  --> 67, -115/53-58, RR 17-18, O2 Sat 98-99 RA He received Tylenol, Meropenem x 1 dose, Potassium repletion, 500 cc NS bolus Blood cultures from 4/16 gres proteus mirabilis and staph lugdunensis. Pt had been started on vancomycin and cefepime was added on return of cx.     Staph was concerning for endocarditis and TTE was performed showing: EF 70%, 1. Thickened mitral valve that is incompletely visualized. Cannot rule out vegetations on the mitral valve.  Mild mitral regurgitation. Normal left ventricular internal dimensions and wall thicknesses. Normal left ventricular systolic function. No segmental wall motion abnormalities. Normal diastolic function normal right ventricular size and function. Cannot rule out endocarditis on the basis of this  technically difficult study. Suggest LAURA if clinical suspicion is high.    PICC was placed for long term antibiotics 4/23 in RUE.     LAURA attempted however patient was not able to tolerate the study. Empiric treatment suggested by ID and patient completed 2 weeks cefepime inpatient and will complete 4 weeks vancomycin by 5/13/17. Patient's son requiested cardiac MRI with sedation so patient cooperates with exam however on discussion with team and cardiology was determined that risk for sedating patient and doing cardiac MRI is higher than risk of doing IV abx.     Prior to discharge lisinopril was increased to 10 mg daily for hypertension. Plan to discharge patient home with home care with PICC line with vancomycin until 5/13/17. Pt to have weekly CBC, CMP and vancomycin trough with home vancomycin. Pt had therapeutic trough of 17.3 at 1g vanc daily prior to discharge.

## 2017-04-28 NOTE — DISCHARGE NOTE ADULT - CARE PROVIDERS DIRECT ADDRESSES
[email or web address],[email or web address] ,[email or web address],DirectAddress_Unknown

## 2017-04-28 NOTE — DISCHARGE NOTE ADULT - CARE PROVIDER_API CALL
Jarod Luna), Cardiac Electrophysiology; Cardiology; Internal Medicine  [address] [place] [address]  Phone: [phone]  Fax: [phone]    Viola Walsh), Infectious Disease; Internal Medicine  [address] [place] [address]  Phone: [phone]  Fax: [phone] Jarod Luna), Cardiac Electrophysiology; Cardiology; Internal Medicine  [address] [place] [address]  Phone: [phone]  Fax: [phone]    Viola Walsh), Infectious Disease; Internal Medicine  [address] [place] [address]  Phone: [phone]  Fax: [phone]    Cornelio Blair), Urology  [address] [place] [id number]  Phone: [phone]  Fax: [phone]

## 2017-04-28 NOTE — DISCHARGE NOTE ADULT - PATIENT PORTAL LINK FT
“You can access the FollowHealth Patient Portal, offered by [facility], by registering with the following website: [email or web address]”

## 2017-04-28 NOTE — DISCHARGE NOTE ADULT - MEDICATION SUMMARY - MEDICATIONS TO STOP TAKING
I will STOP taking the medications listed below when I get home from the hospital:    amLODIPine 10 mg oral tablet  -- 1 tab(s) by mouth once a day    donepezil 10 mg oral tablet  -- 1 tab(s) by mouth once a day (at bedtime)    tamsulosin 0.4 mg oral capsule  -- 1 cap(s) by mouth once a day    OLANZapine 5 mg oral tablet  -- 1 tab(s) by mouth once a day    timolol maleate 0.5% ophthalmic solution  -- 1 drop(s) in each eye 2 times a day

## 2017-04-28 NOTE — DISCHARGE NOTE ADULT - MEDICATION SUMMARY - MEDICATIONS TO TAKE
I will START or STAY ON the medications listed below when I get home from the hospital:    vancomycin trough  -- 1 intravenous once a week  -- Indication: For Empiric treatment for endocarditis    complete blood count blood draw  -- 1 once a week  -- Indication: For Blood draws for home empiric treatment for endocarditis    complete metabolic panel  -- 1 once a week  -- Indication: For Blood draws for home empiric treatment for endocarditis    lisinopril 10 mg oral tablet  -- 1 tab(s) by mouth once a day  -- Indication: For Hypertension    vancomycin 1 g intravenous injection  -- 1 gram(s) intravenous every 24 hours pt followed by Dr. Viola Walsh  -- Indication: For Empiric treatment for endocarditis    memantine 10 mg oral tablet  -- 1 tab(s) by mouth 2 times a day  -- Indication: For Dementia    latanoprost 0.005% ophthalmic solution  -- 1 drop(s) in each eye once a day (in the evening)  -- Indication: For Glaucoma    multivitamin  -- 1 tab(s) by mouth once a day  -- Indication: For Health maintenance

## 2017-04-28 NOTE — DISCHARGE NOTE ADULT - PROVIDER TOKENS
TOKEN:'[id number]:'[id number]' TOKEN:'[id number]:'[id number]:'[id number]'

## 2017-04-28 NOTE — DISCHARGE NOTE ADULT - INSTRUCTIONS
Left palm, cleanse with saline or soap and water, cover with gauze and joselyn. Place soft object in hand for contracture prevention. Please cut nails.

## 2017-04-29 LAB
ALBUMIN SERPL ELPH-MCNC: 3.1 G/DL — LOW (ref 3.3–5)
PREALB SERPL-MCNC: 17 MG/DL — LOW (ref 18–45)
VANCOMYCIN TROUGH SERPL-MCNC: 15.3 UG/ML — SIGNIFICANT CHANGE UP (ref 10–20)

## 2017-04-29 PROCEDURE — 99233 SBSQ HOSP IP/OBS HIGH 50: CPT

## 2017-04-29 RX ADMIN — HEPARIN SODIUM 5000 UNIT(S): 5000 INJECTION INTRAVENOUS; SUBCUTANEOUS at 05:20

## 2017-04-29 RX ADMIN — CEFEPIME 100 MILLIGRAM(S): 1 INJECTION, POWDER, FOR SOLUTION INTRAMUSCULAR; INTRAVENOUS at 17:48

## 2017-04-29 RX ADMIN — MEMANTINE HYDROCHLORIDE 10 MILLIGRAM(S): 10 TABLET ORAL at 05:20

## 2017-04-29 RX ADMIN — LISINOPRIL 10 MILLIGRAM(S): 2.5 TABLET ORAL at 05:20

## 2017-04-29 RX ADMIN — HEPARIN SODIUM 5000 UNIT(S): 5000 INJECTION INTRAVENOUS; SUBCUTANEOUS at 17:48

## 2017-04-29 RX ADMIN — LATANOPROST 1 DROP(S): 0.05 SOLUTION/ DROPS OPHTHALMIC; TOPICAL at 22:00

## 2017-04-29 RX ADMIN — Medication 250 MILLIGRAM(S): at 09:14

## 2017-04-29 RX ADMIN — MEMANTINE HYDROCHLORIDE 10 MILLIGRAM(S): 10 TABLET ORAL at 17:48

## 2017-04-29 RX ADMIN — CEFEPIME 100 MILLIGRAM(S): 1 INJECTION, POWDER, FOR SOLUTION INTRAMUSCULAR; INTRAVENOUS at 05:20

## 2017-04-29 NOTE — PROVIDER CONTACT NOTE (OTHER) - SITUATION
son of patient shoving food into patients mouth. Patient is very lethargic and demented and clenching his mouth together.

## 2017-04-30 LAB
HCT VFR BLD CALC: 34.7 % — LOW (ref 39–50)
HGB BLD-MCNC: 10.6 G/DL — LOW (ref 13–17)
MAGNESIUM SERPL-MCNC: 2.2 MG/DL — SIGNIFICANT CHANGE UP (ref 1.6–2.6)
MCHC RBC-ENTMCNC: 27.7 PG — SIGNIFICANT CHANGE UP (ref 27–34)
MCHC RBC-ENTMCNC: 30.5 GM/DL — LOW (ref 32–36)
MCV RBC AUTO: 90.6 FL — SIGNIFICANT CHANGE UP (ref 80–100)
PHOSPHATE SERPL-MCNC: 2.8 MG/DL — SIGNIFICANT CHANGE UP (ref 2.5–4.5)
PLATELET # BLD AUTO: 417 K/UL — HIGH (ref 150–400)
RBC # BLD: 3.83 M/UL — LOW (ref 4.2–5.8)
RBC # FLD: 15.4 % — HIGH (ref 10.3–14.5)
WBC # BLD: 9.95 K/UL — SIGNIFICANT CHANGE UP (ref 3.8–10.5)
WBC # FLD AUTO: 9.95 K/UL — SIGNIFICANT CHANGE UP (ref 3.8–10.5)

## 2017-04-30 PROCEDURE — 99233 SBSQ HOSP IP/OBS HIGH 50: CPT

## 2017-04-30 RX ORDER — VANCOMYCIN HCL 1 G
1 VIAL (EA) INTRAVENOUS
Qty: 1 | Refills: 0 | OUTPATIENT
Start: 2017-04-30 | End: 2017-05-14

## 2017-04-30 RX ORDER — SODIUM CHLORIDE 9 MG/ML
70 INJECTION INTRAMUSCULAR; INTRAVENOUS; SUBCUTANEOUS ONCE
Qty: 0 | Refills: 0 | Status: COMPLETED | OUTPATIENT
Start: 2017-04-30 | End: 2017-04-30

## 2017-04-30 RX ADMIN — MEMANTINE HYDROCHLORIDE 10 MILLIGRAM(S): 10 TABLET ORAL at 05:43

## 2017-04-30 RX ADMIN — LATANOPROST 1 DROP(S): 0.05 SOLUTION/ DROPS OPHTHALMIC; TOPICAL at 21:27

## 2017-04-30 RX ADMIN — HEPARIN SODIUM 5000 UNIT(S): 5000 INJECTION INTRAVENOUS; SUBCUTANEOUS at 05:43

## 2017-04-30 RX ADMIN — LISINOPRIL 10 MILLIGRAM(S): 2.5 TABLET ORAL at 05:43

## 2017-04-30 RX ADMIN — HEPARIN SODIUM 5000 UNIT(S): 5000 INJECTION INTRAVENOUS; SUBCUTANEOUS at 17:19

## 2017-04-30 RX ADMIN — SODIUM CHLORIDE 5.83 MILLILITER(S): 9 INJECTION INTRAMUSCULAR; INTRAVENOUS; SUBCUTANEOUS at 14:16

## 2017-04-30 RX ADMIN — Medication 250 MILLIGRAM(S): at 10:22

## 2017-04-30 RX ADMIN — MEMANTINE HYDROCHLORIDE 10 MILLIGRAM(S): 10 TABLET ORAL at 17:19

## 2017-04-30 NOTE — PROVIDER CONTACT NOTE (OTHER) - BACKGROUND
Patient admitted for sepsis. History of paroxysmal A.fib.
Pt admitted for UTI, sepsis and endocarditis, history of paroxysmal Afib
Pt admitted for sepsis
Pt admitted for sepsis.
Pt admitted with sepsis on antibiotics. With chronic Barr catheter. Awaiting permanent pacemaker for asymptomatic bradycardia.
Sepsis  HTN  Dementia   BPH  UTI
dementia, sepsis, A & O x 0

## 2017-04-30 NOTE — PROVIDER CONTACT NOTE (OTHER) - ACTION/TREATMENT ORDERED:
MD made aware. Will continue to monitor.
per team 6, continue to monitor sons actions despite the risk of aspiration
recheck BP in an hour
Continue to monitor.
MD made aware, will assess situation bedside.
MD made aware, will continue to monitor.
Ordered Mag and Phos to be drawn in AM.  Will continue to monitor.

## 2017-04-30 NOTE — PROVIDER CONTACT NOTE (OTHER) - ASSESSMENT
Heavily breathing and moaning
AxOx1, advanced dementia
Patient A+O x1; Sleeping comfortably. V/S stable; denies/ no nonverbal indicators of chest pain/ SOB.
Patient and vitals stable.
Pt A&Ox0, VSS, family is force feeding pt with syringe, pt clenching jaw and refusing to be fed at this moment. Order changed earlier in the day for 3 ensures 3 times a day. Dietary currently closed. Manager at bedside with family discussing situation.
Pt A&Ox0-1, pt denies pain or discomfort, pt asymptomatic. HR: now SR 80s, BP = 172/64, RR = 19, O2 = 93%, Temp = 98.8
VS /75 P102 R 20 [id number]% on room air.  No distress noted.  No s/s of SOB noted.

## 2017-04-30 NOTE — PROVIDER CONTACT NOTE (OTHER) - DATE AND TIME:
20-Apr-2017 22:29
25-Apr-2017 02:00
26-Apr-2017
28-Apr-2017 20:17
29-Apr-2017 17:30
30-Apr-2017 04:00
30-Apr-2017 20:46

## 2017-04-30 NOTE — PROVIDER CONTACT NOTE (OTHER) - RECOMMENDATIONS
Continue to monitor.
MD made aware.
Notify MD, assess situation bedside
Notify MD, continue to monitor
monitor pt for hypertensive symptoms.

## 2017-04-30 NOTE — PROVIDER CONTACT NOTE (OTHER) - REASON
Low urine output
Patient converted to A. flutter.
Pt family demanding more ensure and force feeding pt with syringe
Pt noted with 6 beat WCT on tele
episode of PAF up to the 170s for 5.3 seconds
family force feeding patient
pt /84 manually

## 2017-05-01 ENCOUNTER — APPOINTMENT (OUTPATIENT)
Dept: HOME HEALTH SERVICES | Facility: HOME HEALTH | Age: 82
End: 2017-05-01

## 2017-05-01 VITALS — SYSTOLIC BLOOD PRESSURE: 158 MMHG | RESPIRATION RATE: 1 BRPM | HEART RATE: 111 BPM | DIASTOLIC BLOOD PRESSURE: 84 MMHG

## 2017-05-01 PROCEDURE — 82040 ASSAY OF SERUM ALBUMIN: CPT

## 2017-05-01 PROCEDURE — 93005 ELECTROCARDIOGRAM TRACING: CPT

## 2017-05-01 PROCEDURE — 87086 URINE CULTURE/COLONY COUNT: CPT

## 2017-05-01 PROCEDURE — 97162 PT EVAL MOD COMPLEX 30 MIN: CPT

## 2017-05-01 PROCEDURE — 82803 BLOOD GASES ANY COMBINATION: CPT

## 2017-05-01 PROCEDURE — 84132 ASSAY OF SERUM POTASSIUM: CPT

## 2017-05-01 PROCEDURE — C1751: CPT

## 2017-05-01 PROCEDURE — 82553 CREATINE MB FRACTION: CPT

## 2017-05-01 PROCEDURE — 82746 ASSAY OF FOLIC ACID SERUM: CPT

## 2017-05-01 PROCEDURE — 99239 HOSP IP/OBS DSCHRG MGMT >30: CPT

## 2017-05-01 PROCEDURE — 83550 IRON BINDING TEST: CPT

## 2017-05-01 PROCEDURE — 76775 US EXAM ABDO BACK WALL LIM: CPT

## 2017-05-01 PROCEDURE — 85045 AUTOMATED RETICULOCYTE COUNT: CPT

## 2017-05-01 PROCEDURE — 82728 ASSAY OF FERRITIN: CPT

## 2017-05-01 PROCEDURE — 84134 ASSAY OF PREALBUMIN: CPT

## 2017-05-01 PROCEDURE — 97166 OT EVAL MOD COMPLEX 45 MIN: CPT

## 2017-05-01 PROCEDURE — 99285 EMERGENCY DEPT VISIT HI MDM: CPT | Mod: 25

## 2017-05-01 PROCEDURE — 82272 OCCULT BLD FECES 1-3 TESTS: CPT

## 2017-05-01 PROCEDURE — 87040 BLOOD CULTURE FOR BACTERIA: CPT

## 2017-05-01 PROCEDURE — 82607 VITAMIN B-12: CPT

## 2017-05-01 PROCEDURE — 83735 ASSAY OF MAGNESIUM: CPT

## 2017-05-01 PROCEDURE — 83605 ASSAY OF LACTIC ACID: CPT

## 2017-05-01 PROCEDURE — 84484 ASSAY OF TROPONIN QUANT: CPT

## 2017-05-01 PROCEDURE — 84295 ASSAY OF SERUM SODIUM: CPT

## 2017-05-01 PROCEDURE — 80048 BASIC METABOLIC PNL TOTAL CA: CPT

## 2017-05-01 PROCEDURE — 85610 PROTHROMBIN TIME: CPT

## 2017-05-01 PROCEDURE — 80053 COMPREHEN METABOLIC PANEL: CPT

## 2017-05-01 PROCEDURE — 84100 ASSAY OF PHOSPHORUS: CPT

## 2017-05-01 PROCEDURE — 71045 X-RAY EXAM CHEST 1 VIEW: CPT

## 2017-05-01 PROCEDURE — 82550 ASSAY OF CK (CPK): CPT

## 2017-05-01 PROCEDURE — 85027 COMPLETE CBC AUTOMATED: CPT

## 2017-05-01 PROCEDURE — 99232 SBSQ HOSP IP/OBS MODERATE 35: CPT

## 2017-05-01 PROCEDURE — 36569 INSJ PICC 5 YR+ W/O IMAGING: CPT

## 2017-05-01 PROCEDURE — 85014 HEMATOCRIT: CPT

## 2017-05-01 PROCEDURE — 80202 ASSAY OF VANCOMYCIN: CPT

## 2017-05-01 PROCEDURE — 82330 ASSAY OF CALCIUM: CPT

## 2017-05-01 PROCEDURE — 82435 ASSAY OF BLOOD CHLORIDE: CPT

## 2017-05-01 PROCEDURE — 93306 TTE W/DOPPLER COMPLETE: CPT

## 2017-05-01 PROCEDURE — 82947 ASSAY GLUCOSE BLOOD QUANT: CPT

## 2017-05-01 PROCEDURE — 81001 URINALYSIS AUTO W/SCOPE: CPT

## 2017-05-01 PROCEDURE — 87186 SC STD MICRODIL/AGAR DIL: CPT

## 2017-05-01 PROCEDURE — 85730 THROMBOPLASTIN TIME PARTIAL: CPT

## 2017-05-01 PROCEDURE — 96374 THER/PROPH/DIAG INJ IV PUSH: CPT

## 2017-05-01 PROCEDURE — 96375 TX/PRO/DX INJ NEW DRUG ADDON: CPT

## 2017-05-01 PROCEDURE — 97530 THERAPEUTIC ACTIVITIES: CPT

## 2017-05-01 RX ORDER — LISINOPRIL 2.5 MG/1
1 TABLET ORAL
Qty: 0 | Refills: 0 | COMMUNITY
Start: 2017-05-01

## 2017-05-01 RX ORDER — VANCOMYCIN HCL 1 G
1 VIAL (EA) INTRAVENOUS
Qty: 1 | Refills: 0 | OUTPATIENT
Start: 2017-05-01 | End: 2017-05-15

## 2017-05-01 RX ADMIN — LISINOPRIL 10 MILLIGRAM(S): 2.5 TABLET ORAL at 05:20

## 2017-05-01 RX ADMIN — MEMANTINE HYDROCHLORIDE 10 MILLIGRAM(S): 10 TABLET ORAL at 17:15

## 2017-05-01 RX ADMIN — Medication 250 MILLIGRAM(S): at 09:52

## 2017-05-01 RX ADMIN — HEPARIN SODIUM 5000 UNIT(S): 5000 INJECTION INTRAVENOUS; SUBCUTANEOUS at 05:21

## 2017-05-01 RX ADMIN — HEPARIN SODIUM 5000 UNIT(S): 5000 INJECTION INTRAVENOUS; SUBCUTANEOUS at 17:15

## 2017-05-01 RX ADMIN — MEMANTINE HYDROCHLORIDE 10 MILLIGRAM(S): 10 TABLET ORAL at 05:21

## 2017-05-03 ENCOUNTER — APPOINTMENT (OUTPATIENT)
Dept: HOME HEALTH SERVICES | Facility: HOME HEALTH | Age: 82
End: 2017-05-03

## 2017-05-03 VITALS
OXYGEN SATURATION: 94 % | RESPIRATION RATE: 16 BRPM | HEART RATE: 70 BPM | SYSTOLIC BLOOD PRESSURE: 170 MMHG | DIASTOLIC BLOOD PRESSURE: 80 MMHG

## 2017-05-03 DIAGNOSIS — N39.0 URINARY TRACT INFECTION, SITE NOT SPECIFIED: ICD-10-CM

## 2017-05-03 DIAGNOSIS — G30.9 ALZHEIMER'S DISEASE, UNSPECIFIED: ICD-10-CM

## 2017-05-03 DIAGNOSIS — F03.91 UNSPECIFIED DEMENTIA WITH BEHAVIORAL DISTURBANCE: ICD-10-CM

## 2017-05-03 DIAGNOSIS — F02.80 ALZHEIMER'S DISEASE, UNSPECIFIED: ICD-10-CM

## 2017-05-03 DIAGNOSIS — R82.90 UNSPECIFIED ABNORMAL FINDINGS IN URINE: ICD-10-CM

## 2017-05-03 DIAGNOSIS — I10 ESSENTIAL (PRIMARY) HYPERTENSION: ICD-10-CM

## 2017-05-03 DIAGNOSIS — I38 ENDOCARDITIS, VALVE UNSPECIFIED: ICD-10-CM

## 2017-05-03 RX ORDER — MEMANTINE HYDROCHLORIDE 10 MG/1
10 TABLET ORAL TWICE DAILY
Qty: 60 | Refills: 3 | Status: ACTIVE | COMMUNITY
Start: 2017-05-03

## 2017-05-03 RX ORDER — LISINOPRIL 10 MG/1
10 TABLET ORAL DAILY
Qty: 30 | Refills: 5 | Status: ACTIVE | COMMUNITY
Start: 2017-05-03

## 2017-05-06 ENCOUNTER — INPATIENT (INPATIENT)
Facility: HOSPITAL | Age: 82
LOS: 8 days | Discharge: HOME HEALTH | DRG: 871 | End: 2017-05-15
Attending: HOSPITALIST | Admitting: HOSPITALIST
Payer: MEDICARE

## 2017-05-06 VITALS
HEIGHT: 70 IN | TEMPERATURE: 100 F | DIASTOLIC BLOOD PRESSURE: 79 MMHG | SYSTOLIC BLOOD PRESSURE: 151 MMHG | WEIGHT: 160.06 LBS | HEART RATE: 112 BPM | OXYGEN SATURATION: 100 % | RESPIRATION RATE: 16 BRPM

## 2017-05-06 DIAGNOSIS — Z90.79 ACQUIRED ABSENCE OF OTHER GENITAL ORGAN(S): Chronic | ICD-10-CM

## 2017-05-06 DIAGNOSIS — N40.1 BENIGN PROSTATIC HYPERPLASIA WITH LOWER URINARY TRACT SYMPTOMS: ICD-10-CM

## 2017-05-06 DIAGNOSIS — A41.9 SEPSIS, UNSPECIFIED ORGANISM: ICD-10-CM

## 2017-05-06 DIAGNOSIS — I48.0 PAROXYSMAL ATRIAL FIBRILLATION: ICD-10-CM

## 2017-05-06 DIAGNOSIS — I10 ESSENTIAL (PRIMARY) HYPERTENSION: ICD-10-CM

## 2017-05-06 DIAGNOSIS — N17.9 ACUTE KIDNEY FAILURE, UNSPECIFIED: ICD-10-CM

## 2017-05-06 DIAGNOSIS — Z71.89 OTHER SPECIFIED COUNSELING: ICD-10-CM

## 2017-05-06 DIAGNOSIS — Z90.49 ACQUIRED ABSENCE OF OTHER SPECIFIED PARTS OF DIGESTIVE TRACT: Chronic | ICD-10-CM

## 2017-05-06 DIAGNOSIS — G30.9 ALZHEIMER'S DISEASE, UNSPECIFIED: ICD-10-CM

## 2017-05-06 LAB
ALBUMIN SERPL ELPH-MCNC: 2.6 G/DL — LOW (ref 3.5–5)
ALP SERPL-CCNC: 95 U/L — SIGNIFICANT CHANGE UP (ref 40–120)
ALT FLD-CCNC: 20 U/L DA — SIGNIFICANT CHANGE UP (ref 10–60)
ANION GAP SERPL CALC-SCNC: 7 MMOL/L — SIGNIFICANT CHANGE UP (ref 5–17)
APPEARANCE UR: ABNORMAL
AST SERPL-CCNC: 21 U/L — SIGNIFICANT CHANGE UP (ref 10–40)
BASOPHILS # BLD AUTO: 0.1 K/UL — SIGNIFICANT CHANGE UP (ref 0–0.2)
BASOPHILS NFR BLD AUTO: 0.7 % — SIGNIFICANT CHANGE UP (ref 0–2)
BILIRUB SERPL-MCNC: 0.6 MG/DL — SIGNIFICANT CHANGE UP (ref 0.2–1.2)
BILIRUB UR-MCNC: NEGATIVE — SIGNIFICANT CHANGE UP
BUN SERPL-MCNC: 13 MG/DL — SIGNIFICANT CHANGE UP (ref 7–18)
CALCIUM SERPL-MCNC: 8.9 MG/DL — SIGNIFICANT CHANGE UP (ref 8.4–10.5)
CHLORIDE SERPL-SCNC: 101 MMOL/L — SIGNIFICANT CHANGE UP (ref 96–108)
CO2 SERPL-SCNC: 36 MMOL/L — HIGH (ref 22–31)
COLOR SPEC: YELLOW — SIGNIFICANT CHANGE UP
CREAT SERPL-MCNC: 1.36 MG/DL — HIGH (ref 0.5–1.3)
DIFF PNL FLD: ABNORMAL
EOSINOPHIL # BLD AUTO: 0 K/UL — SIGNIFICANT CHANGE UP (ref 0–0.5)
EOSINOPHIL NFR BLD AUTO: 0.4 % — SIGNIFICANT CHANGE UP (ref 0–6)
GLUCOSE SERPL-MCNC: 118 MG/DL — HIGH (ref 70–99)
GLUCOSE UR QL: NEGATIVE — SIGNIFICANT CHANGE UP
HCT VFR BLD CALC: 37.6 % — LOW (ref 39–50)
HGB BLD-MCNC: 11.9 G/DL — LOW (ref 13–17)
KETONES UR-MCNC: NEGATIVE — SIGNIFICANT CHANGE UP
LACTATE SERPL-SCNC: 1.3 MMOL/L — SIGNIFICANT CHANGE UP (ref 0.7–2)
LACTATE SERPL-SCNC: 4.1 MMOL/L — CRITICAL HIGH (ref 0.7–2)
LEUKOCYTE ESTERASE UR-ACNC: ABNORMAL
LYMPHOCYTES # BLD AUTO: 1.5 K/UL — SIGNIFICANT CHANGE UP (ref 1–3.3)
LYMPHOCYTES # BLD AUTO: 12.6 % — LOW (ref 13–44)
MCHC RBC-ENTMCNC: 29 PG — SIGNIFICANT CHANGE UP (ref 27–34)
MCHC RBC-ENTMCNC: 31.6 GM/DL — LOW (ref 32–36)
MCV RBC AUTO: 91.8 FL — SIGNIFICANT CHANGE UP (ref 80–100)
MONOCYTES # BLD AUTO: 0.5 K/UL — SIGNIFICANT CHANGE UP (ref 0–0.9)
MONOCYTES NFR BLD AUTO: 4.1 % — SIGNIFICANT CHANGE UP (ref 2–14)
NEUTROPHILS # BLD AUTO: 9.7 K/UL — HIGH (ref 1.8–7.4)
NEUTROPHILS NFR BLD AUTO: 82.2 % — HIGH (ref 43–77)
NITRITE UR-MCNC: NEGATIVE — SIGNIFICANT CHANGE UP
PH UR: 7 — SIGNIFICANT CHANGE UP (ref 5–8)
PLATELET # BLD AUTO: 370 K/UL — SIGNIFICANT CHANGE UP (ref 150–400)
POTASSIUM SERPL-MCNC: 3.9 MMOL/L — SIGNIFICANT CHANGE UP (ref 3.5–5.3)
POTASSIUM SERPL-SCNC: 3.9 MMOL/L — SIGNIFICANT CHANGE UP (ref 3.5–5.3)
PROT SERPL-MCNC: 7.5 G/DL — SIGNIFICANT CHANGE UP (ref 6–8.3)
PROT UR-MCNC: 30 MG/DL
RAPID RVP RESULT: SIGNIFICANT CHANGE UP
RBC # BLD: 4.1 M/UL — LOW (ref 4.2–5.8)
RBC # FLD: 14.9 % — HIGH (ref 10.3–14.5)
SODIUM SERPL-SCNC: 144 MMOL/L — SIGNIFICANT CHANGE UP (ref 135–145)
SP GR SPEC: 1 — LOW (ref 1.01–1.02)
UROBILINOGEN FLD QL: NEGATIVE — SIGNIFICANT CHANGE UP
VANCOMYCIN TROUGH SERPL-MCNC: 21.9 UG/ML — HIGH (ref 10–20)
WBC # BLD: 11.8 K/UL — HIGH (ref 3.8–10.5)
WBC # FLD AUTO: 11.8 K/UL — HIGH (ref 3.8–10.5)

## 2017-05-06 PROCEDURE — 71010: CPT | Mod: 26

## 2017-05-06 PROCEDURE — 99285 EMERGENCY DEPT VISIT HI MDM: CPT | Mod: 25

## 2017-05-06 PROCEDURE — 99223 1ST HOSP IP/OBS HIGH 75: CPT | Mod: AI,GC

## 2017-05-06 RX ORDER — PIPERACILLIN AND TAZOBACTAM 4; .5 G/20ML; G/20ML
3.38 INJECTION, POWDER, LYOPHILIZED, FOR SOLUTION INTRAVENOUS EVERY 8 HOURS
Qty: 0 | Refills: 0 | Status: DISCONTINUED | OUTPATIENT
Start: 2017-05-06 | End: 2017-05-06

## 2017-05-06 RX ORDER — CEFEPIME 1 G/1
INJECTION, POWDER, FOR SOLUTION INTRAMUSCULAR; INTRAVENOUS
Qty: 0 | Refills: 0 | Status: DISCONTINUED | OUTPATIENT
Start: 2017-05-06 | End: 2017-05-06

## 2017-05-06 RX ORDER — SODIUM CHLORIDE 9 MG/ML
1000 INJECTION INTRAMUSCULAR; INTRAVENOUS; SUBCUTANEOUS ONCE
Qty: 0 | Refills: 0 | Status: COMPLETED | OUTPATIENT
Start: 2017-05-06 | End: 2017-05-06

## 2017-05-06 RX ORDER — CEFEPIME 1 G/1
500 INJECTION, POWDER, FOR SOLUTION INTRAMUSCULAR; INTRAVENOUS ONCE
Qty: 0 | Refills: 0 | Status: COMPLETED | OUTPATIENT
Start: 2017-05-06 | End: 2017-05-06

## 2017-05-06 RX ORDER — SODIUM CHLORIDE 9 MG/ML
1000 INJECTION INTRAMUSCULAR; INTRAVENOUS; SUBCUTANEOUS
Qty: 0 | Refills: 0 | Status: DISCONTINUED | OUTPATIENT
Start: 2017-05-06 | End: 2017-05-08

## 2017-05-06 RX ORDER — MEMANTINE HYDROCHLORIDE 10 MG/1
1 TABLET ORAL
Qty: 0 | Refills: 0 | COMMUNITY

## 2017-05-06 RX ORDER — PIPERACILLIN AND TAZOBACTAM 4; .5 G/20ML; G/20ML
3.38 INJECTION, POWDER, LYOPHILIZED, FOR SOLUTION INTRAVENOUS ONCE
Qty: 0 | Refills: 0 | Status: COMPLETED | OUTPATIENT
Start: 2017-05-06 | End: 2017-05-06

## 2017-05-06 RX ORDER — LATANOPROST 0.05 MG/ML
1 SOLUTION/ DROPS OPHTHALMIC; TOPICAL AT BEDTIME
Qty: 0 | Refills: 0 | Status: DISCONTINUED | OUTPATIENT
Start: 2017-05-06 | End: 2017-05-15

## 2017-05-06 RX ORDER — HEPARIN SODIUM 5000 [USP'U]/ML
5000 INJECTION INTRAVENOUS; SUBCUTANEOUS EVERY 8 HOURS
Qty: 0 | Refills: 0 | Status: DISCONTINUED | OUTPATIENT
Start: 2017-05-06 | End: 2017-05-15

## 2017-05-06 RX ORDER — HEPARIN SODIUM 5000 [USP'U]/ML
5000 INJECTION INTRAVENOUS; SUBCUTANEOUS EVERY 12 HOURS
Qty: 0 | Refills: 0 | Status: DISCONTINUED | OUTPATIENT
Start: 2017-05-06 | End: 2017-05-06

## 2017-05-06 RX ORDER — METRONIDAZOLE 500 MG
TABLET ORAL
Qty: 0 | Refills: 0 | Status: DISCONTINUED | OUTPATIENT
Start: 2017-05-06 | End: 2017-05-14

## 2017-05-06 RX ORDER — IPRATROPIUM/ALBUTEROL SULFATE 18-103MCG
3 AEROSOL WITH ADAPTER (GRAM) INHALATION EVERY 6 HOURS
Qty: 0 | Refills: 0 | Status: DISCONTINUED | OUTPATIENT
Start: 2017-05-06 | End: 2017-05-15

## 2017-05-06 RX ORDER — METRONIDAZOLE 500 MG
500 TABLET ORAL EVERY 8 HOURS
Qty: 0 | Refills: 0 | Status: DISCONTINUED | OUTPATIENT
Start: 2017-05-06 | End: 2017-05-14

## 2017-05-06 RX ORDER — LATANOPROST 0.05 MG/ML
1 SOLUTION/ DROPS OPHTHALMIC; TOPICAL
Qty: 0 | Refills: 0 | COMMUNITY

## 2017-05-06 RX ORDER — CEFEPIME 1 G/1
INJECTION, POWDER, FOR SOLUTION INTRAMUSCULAR; INTRAVENOUS
Qty: 0 | Refills: 0 | Status: DISCONTINUED | OUTPATIENT
Start: 2017-05-06 | End: 2017-05-14

## 2017-05-06 RX ORDER — VANCOMYCIN HCL 1 G
1000 VIAL (EA) INTRAVENOUS EVERY 24 HOURS
Qty: 0 | Refills: 0 | Status: DISCONTINUED | OUTPATIENT
Start: 2017-05-07 | End: 2017-05-14

## 2017-05-06 RX ORDER — METRONIDAZOLE 500 MG
500 TABLET ORAL ONCE
Qty: 0 | Refills: 0 | Status: COMPLETED | OUTPATIENT
Start: 2017-05-06 | End: 2017-05-06

## 2017-05-06 RX ORDER — CEFEPIME 1 G/1
1000 INJECTION, POWDER, FOR SOLUTION INTRAMUSCULAR; INTRAVENOUS ONCE
Qty: 0 | Refills: 0 | Status: DISCONTINUED | OUTPATIENT
Start: 2017-05-06 | End: 2017-05-06

## 2017-05-06 RX ORDER — CEFEPIME 1 G/1
500 INJECTION, POWDER, FOR SOLUTION INTRAMUSCULAR; INTRAVENOUS EVERY 12 HOURS
Qty: 0 | Refills: 0 | Status: DISCONTINUED | OUTPATIENT
Start: 2017-05-07 | End: 2017-05-14

## 2017-05-06 RX ORDER — LISINOPRIL 2.5 MG/1
10 TABLET ORAL DAILY
Qty: 0 | Refills: 0 | Status: DISCONTINUED | OUTPATIENT
Start: 2017-05-06 | End: 2017-05-15

## 2017-05-06 RX ORDER — MEMANTINE HYDROCHLORIDE 10 MG/1
10 TABLET ORAL
Qty: 0 | Refills: 0 | Status: DISCONTINUED | OUTPATIENT
Start: 2017-05-06 | End: 2017-05-15

## 2017-05-06 RX ADMIN — Medication 100 MILLIGRAM(S): at 18:19

## 2017-05-06 RX ADMIN — HEPARIN SODIUM 5000 UNIT(S): 5000 INJECTION INTRAVENOUS; SUBCUTANEOUS at 22:26

## 2017-05-06 RX ADMIN — SODIUM CHLORIDE 75 MILLILITER(S): 9 INJECTION INTRAMUSCULAR; INTRAVENOUS; SUBCUTANEOUS at 18:31

## 2017-05-06 RX ADMIN — PIPERACILLIN AND TAZOBACTAM 200 GRAM(S): 4; .5 INJECTION, POWDER, LYOPHILIZED, FOR SOLUTION INTRAVENOUS at 13:24

## 2017-05-06 RX ADMIN — SODIUM CHLORIDE 1000 MILLILITER(S): 9 INJECTION INTRAMUSCULAR; INTRAVENOUS; SUBCUTANEOUS at 12:23

## 2017-05-06 RX ADMIN — CEFEPIME 100 MILLIGRAM(S): 1 INJECTION, POWDER, FOR SOLUTION INTRAMUSCULAR; INTRAVENOUS at 19:21

## 2017-05-06 RX ADMIN — MEMANTINE HYDROCHLORIDE 10 MILLIGRAM(S): 10 TABLET ORAL at 18:20

## 2017-05-06 RX ADMIN — SODIUM CHLORIDE 1000 MILLILITER(S): 9 INJECTION INTRAMUSCULAR; INTRAVENOUS; SUBCUTANEOUS at 11:55

## 2017-05-06 RX ADMIN — Medication 100 MILLIGRAM(S): at 22:26

## 2017-05-06 RX ADMIN — Medication 3 MILLILITER(S): at 20:44

## 2017-05-06 RX ADMIN — LATANOPROST 1 DROP(S): 0.05 SOLUTION/ DROPS OPHTHALMIC; TOPICAL at 22:26

## 2017-05-06 RX ADMIN — LISINOPRIL 10 MILLIGRAM(S): 2.5 TABLET ORAL at 22:26

## 2017-05-06 NOTE — H&P ADULT. - PROBLEM SELECTOR PLAN 1
Secondary to uti versus secondary to aspiration pna  Patient is already being emperically treated with vancomycin for endocarditis. Will add zosyn for anaerobic coverage  Will hold vancomycin as trough is 22 . F/u repeat vancomycin trough in am and restart  Labs are significant for elevated white count of 11.8 with a lactate 4.1  Qsofa score of 1 for increasing lethargy . Appropriate to be managed on floor at this time  Will repeat lactate after patient gets 2 litres of fluid  F/u procalcitonin and repeat lactate  UA is positive . Patient has a chronic carlson changed last week. Will change now  follow blood & urine cultures  rvp negative  Patient also has a PICC that was placed for long term antibiotics 4/23 in [facility], which will need to be changed if patient continues spike fevers

## 2017-05-06 NOTE — ED PROVIDER NOTE - OBJECTIVE STATEMENT
86 year-old male, history of dementia (baseline A+O x name), atrial fibrillation, HTN, glaucoma, BPH, brought by son for evaluation of difficulty breathing, wheezing, productive cough and fever x few days. Patient was recently discharged from [facility] after being treated for sepsis and bacteremia, finished 2 week course of Cefepime and currently on Vancomycin IV through PICC until 05/13/2017.

## 2017-05-06 NOTE — ED PROVIDER NOTE - CARE PLAN
Principal Discharge DX:	Sepsis  Secondary Diagnosis:	Pneumonia  Secondary Diagnosis:	UTI (urinary tract infection)

## 2017-05-06 NOTE — H&P ADULT. - ASSESSMENT
Patient is a 86 year-old male, history of dementia , bed bound and non verbal, paroxysmal atrial fibrillation not on anticoagulation , HTN, glaucoma, BPH was brought in by son as he was acutely short of breath, tachypneic and wheezing when son who is a physician examined him this morning . Son also reports that patient felt warm and has had productive cough and has been lethargic since he was discharged from the hospital a week ago. He was  discharged from [facility] last week after being treated for sepsis and bacteremia with concern for endocarditis and finished 2 week course of Cefepime and currently on Vancomycin IV through PICC until 05/13/2017. In the ed vitals are significant for tmax of 100.2 and heart rate of 112. Labs are significant for elevated white count of 11.8 with a lactate 4.1. Creatinine on admission is 1.36. UA is positive. Cxr not significant for fluid or infiltrate . Patient is admitted for sepsis from UTI vs HCAP

## 2017-05-06 NOTE — H&P ADULT. - PROBLEM SELECTOR PLAN 6
continue with chronic carlson. hold home Lisinopril as concern for sepsis and CHARLIE . Restart as appropriate To continue home dose Lisinopril as per discussion with attending . To discontinue if CHARLIE continues to worsen

## 2017-05-06 NOTE — H&P ADULT. - ATTENDING COMMENTS
Patient was seen and examined by myself with team. Case was discussed with house staff in details.  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 Also with abnormal urinalysis with indwelling Barr catheter  Will continue with IV vancomycin; add cefepime empirically due to concern for gram negative bacterial PNA as patient was recently hospitalized. Flagyl for anaerobic coverage as possibility of aspiration too.  Patient has advanced dementia - supportive measures.   Other plan as detailed above.  Patient's son was contacted by team.  Prognosis is guarded; GOC - comfort.

## 2017-05-06 NOTE — ED PROVIDER NOTE - ATTENDING CONTRIBUTION TO CARE
87yo M w dementia/Afib in the ER brought in by son (MD) for tachypnea, SOB, cough, worsening in the past week, since DC from [facility]. Had been treated for sepsis w Cefepime for 2 wks, now on Vanc via PICC line. Tachypnea on exam, low grade fevers, lungs w mild crackles to RLL. Abdomen soft, nontender, dry mucosae. indwelling Barr. Likely sepsis 2/2 PNA. Will give broad spectrum abx, likely admit

## 2017-05-06 NOTE — H&P ADULT. - PROBLEM SELECTOR PLAN 4
patient at mental baseline per son at bedside but slightly less interactive since recent hospitalization .continue with home medications memantine  aspiration precautions  fall precautions.

## 2017-05-06 NOTE — H&P ADULT. - PROBLEM SELECTOR PLAN 2
cr on admission is 1.36   Likely pre-renal secondary to dehydration &UTI . Could also be secondary to vancomycin or lisinoprol both newly started on hospital discharge last week  Normal baseline  F/U urine electrolytes & bmp in am . Renal US maybe needed if does not improve

## 2017-05-06 NOTE — ED PROVIDER NOTE - MEDICAL DECISION MAKING DETAILS
86 year-old male, brought by son for dyspnea, prod cough and fever. Altered (baseline), tachypneic at RR 24, O2 sat 93% RA. Plan: workup, likely admission

## 2017-05-06 NOTE — ED ADULT NURSE NOTE - OBJECTIVE STATEMENT
froome - confused ,non -verbal ,with Barr cath on for sob and fever from home with R upper arm PICC line  - confused ,non -verbal ,with Barr cath on -with  sob and fever

## 2017-05-06 NOTE — H&P ADULT. - GASTROINTESTINAL DETAILS
normal/no distention/no bruit/no masses palpable/no rebound tenderness/no guarding/nontender/bowel sounds normal/no rigidity/soft

## 2017-05-06 NOTE — H&P ADULT. - PROBLEM SELECTOR PLAN 3
No anticoagulation   TTE from previous admission last week showing: EF 70% and thickened mitral valve that is incompletely visualized. Cannot rule out vegetations on the mitral valve. Mild mitral regurgitation. Normal left ventricular internal dimensions and wall thicknesses. Normal left ventricular systolic function.

## 2017-05-06 NOTE — H&P ADULT. - OTHER
Labs are significant for elevated white count of 11.8 with a lactate 4.1. Creatinine on admission is 1.36. UA is positive.

## 2017-05-06 NOTE — H&P ADULT. - PROBLEM SELECTOR PLAN 7
Discussed advanced directives with sonCornelio- states patient's HCP.  Patient is DNR/DNI.  Documentation complete in chart

## 2017-05-06 NOTE — H&P ADULT. - HISTORY OF PRESENT ILLNESS
Patient is a 86 year-old male, history of dementia , bed bound and non verbal, paroxysmal atrial fibrillation not on anticoagulation , HTN, glaucoma, BPH was brought in by son as he was acutely short of breath, tachypneic and wheezing when son who is a physician examined him this morning . Son also reports that patient felt warm and has had productive cough and has been lethargic since he was discharged from the hospital a week ago. He was  discharged from [facility] last week after being treated for sepsis and bacteremia with concern for endocarditis and finished 2 week course of Cefepime and currently on Vancomycin IV through PICC until 05/13/2017.History obtained by patient's son Dr. Sotelo, oncologist who said that patient is usually aggressively fed and hydrated by his aide and son using a syringe and that he might have aspirated . Son also refused an official speech and swallow evaluation and wants him to resume his home diet of thickened liquids . He wants his father to be kept as comfortable as possible and confirmed DNR/DNI status and does not want any NGT or other feeding tubes . Patients recent admission in [facility] significant for blood cultures from 4/16 that grew staphylococcus lugdensis sensitive to vancomycin and proteus mirabilis . Repeat cultures from 4/18 are negative.Patient is being emperically treated for endocarditis secondary to technical difficulties in performin a [facility] and he was also admitted to the ccu there as he was  bradycardic to the 30's which per previous resolved after timolol eye drops discontinued . Patient is a non smoker & non alcoholic .He lives with wife and son & has a hha . Unable to obtain any history from patient secondary to non verbal status Patient is a 86 year-old male, history of dementia , bed bound and non verbal, paroxysmal atrial fibrillation not on anticoagulation , HTN, glaucoma, BPH was brought in by son as he was acutely short of breath, tachypneic and wheezing when son who is a physician examined him this morning . Son also reports that patient felt warm and has had productive cough and has been lethargic since he was discharged from the hospital a week ago. He was  discharged from [facility] last week after being treated for sepsis and bacteremia with concern for endocarditis and finished 2 week course of Cefepime and currently on Vancomycin IV through PICC until 05/13/2017.History obtained by patient's son Dr. Sotelo, oncologist who said that patient is usually aggressively fed and hydrated by his aide and son using a syringe and that he might have aspirated . Son also refused an official speech and swallow evaluation and wants him to resume his home diet of thickened liquids . He wants his father to be kept as comfortable as possible and confirmed DNR/DNI status and does not want any NGT or other feeding tubes . Patients recent admission in [facility] significant for blood cultures from 4/16 that grew staphylococcus lugdensis sensitive to vancomycin and proteus mirabilis . Repeat cultures from 4/18 are negative.Patient is being emperically treated for endocarditis secondary to technical difficulties in performin a [facility] and he was also admitted to the ccu there as he was  bradycardic to the 30's which per previous documentation resolved after timolol eye drops discontinued . Patient is a non smoker & non alcoholic .He lives with wife and son & has a hha . Unable to obtain any history from patient secondary to non verbal status

## 2017-05-06 NOTE — ED PROVIDER NOTE - PROGRESS NOTE DETAILS
CXR unremarkable compared to previous xrays but clinically likely pneumonia. UA shows UTI. Will cover with Levaquin and Zosyn, fluid rescus and admission for IV antibiotics and observation. Appears better with fluids and supplemental o2.

## 2017-05-07 DIAGNOSIS — A41.9 SEPSIS, UNSPECIFIED ORGANISM: ICD-10-CM

## 2017-05-07 LAB
ALBUMIN SERPL ELPH-MCNC: 2.1 G/DL — LOW (ref 3.5–5)
ALP SERPL-CCNC: 73 U/L — SIGNIFICANT CHANGE UP (ref 40–120)
ALT FLD-CCNC: 15 U/L DA — SIGNIFICANT CHANGE UP (ref 10–60)
ANION GAP SERPL CALC-SCNC: 4 MMOL/L — LOW (ref 5–17)
AST SERPL-CCNC: 21 U/L — SIGNIFICANT CHANGE UP (ref 10–40)
BASOPHILS # BLD AUTO: 0 K/UL — SIGNIFICANT CHANGE UP (ref 0–0.2)
BASOPHILS NFR BLD AUTO: 0.5 % — SIGNIFICANT CHANGE UP (ref 0–2)
BILIRUB SERPL-MCNC: 0.5 MG/DL — SIGNIFICANT CHANGE UP (ref 0.2–1.2)
BUN SERPL-MCNC: 12 MG/DL — SIGNIFICANT CHANGE UP (ref 7–18)
CALCIUM SERPL-MCNC: 8.4 MG/DL — SIGNIFICANT CHANGE UP (ref 8.4–10.5)
CHLORIDE SERPL-SCNC: 107 MMOL/L — SIGNIFICANT CHANGE UP (ref 96–108)
CHOLEST SERPL-MCNC: 125 MG/DL — SIGNIFICANT CHANGE UP (ref 10–199)
CO2 SERPL-SCNC: 35 MMOL/L — HIGH (ref 22–31)
CREAT SERPL-MCNC: 1.2 MG/DL — SIGNIFICANT CHANGE UP (ref 0.5–1.3)
CULTURE RESULTS: SIGNIFICANT CHANGE UP
EOSINOPHIL # BLD AUTO: 0.2 K/UL — SIGNIFICANT CHANGE UP (ref 0–0.5)
EOSINOPHIL NFR BLD AUTO: 2.5 % — SIGNIFICANT CHANGE UP (ref 0–6)
GLUCOSE SERPL-MCNC: 82 MG/DL — SIGNIFICANT CHANGE UP (ref 70–99)
HBA1C BLD-MCNC: 5.6 % — SIGNIFICANT CHANGE UP (ref 4–5.6)
HCT VFR BLD CALC: 32.3 % — LOW (ref 39–50)
HDLC SERPL-MCNC: 42 MG/DL — SIGNIFICANT CHANGE UP (ref 40–125)
HGB BLD-MCNC: 9.9 G/DL — LOW (ref 13–17)
LIPID PNL WITH DIRECT LDL SERPL: 71 MG/DL — SIGNIFICANT CHANGE UP
LYMPHOCYTES # BLD AUTO: 1.4 K/UL — SIGNIFICANT CHANGE UP (ref 1–3.3)
LYMPHOCYTES # BLD AUTO: 18.2 % — SIGNIFICANT CHANGE UP (ref 13–44)
MAGNESIUM SERPL-MCNC: 2 MG/DL — SIGNIFICANT CHANGE UP (ref 1.8–2.4)
MCHC RBC-ENTMCNC: 28.6 PG — SIGNIFICANT CHANGE UP (ref 27–34)
MCHC RBC-ENTMCNC: 30.8 GM/DL — LOW (ref 32–36)
MCV RBC AUTO: 93.1 FL — SIGNIFICANT CHANGE UP (ref 80–100)
MONOCYTES # BLD AUTO: 0.5 K/UL — SIGNIFICANT CHANGE UP (ref 0–0.9)
MONOCYTES NFR BLD AUTO: 6.1 % — SIGNIFICANT CHANGE UP (ref 2–14)
NEUTROPHILS # BLD AUTO: 5.4 K/UL — SIGNIFICANT CHANGE UP (ref 1.8–7.4)
NEUTROPHILS NFR BLD AUTO: 72.7 % — SIGNIFICANT CHANGE UP (ref 43–77)
PHOSPHATE SERPL-MCNC: 2.9 MG/DL — SIGNIFICANT CHANGE UP (ref 2.5–4.5)
PLATELET # BLD AUTO: 239 K/UL — SIGNIFICANT CHANGE UP (ref 150–400)
POTASSIUM SERPL-MCNC: 3.9 MMOL/L — SIGNIFICANT CHANGE UP (ref 3.5–5.3)
POTASSIUM SERPL-SCNC: 3.9 MMOL/L — SIGNIFICANT CHANGE UP (ref 3.5–5.3)
PROT SERPL-MCNC: 6.3 G/DL — SIGNIFICANT CHANGE UP (ref 6–8.3)
RBC # BLD: 3.47 M/UL — LOW (ref 4.2–5.8)
RBC # FLD: 14.4 % — SIGNIFICANT CHANGE UP (ref 10.3–14.5)
SODIUM SERPL-SCNC: 146 MMOL/L — HIGH (ref 135–145)
SPECIMEN SOURCE: SIGNIFICANT CHANGE UP
TOTAL CHOLESTEROL/HDL RATIO MEASUREMENT: 3 RATIO — LOW (ref 3.4–9.6)
TRIGL SERPL-MCNC: 61 MG/DL — SIGNIFICANT CHANGE UP (ref 10–149)
VANCOMYCIN TROUGH SERPL-MCNC: 16.4 UG/ML — SIGNIFICANT CHANGE UP (ref 10–20)
VIT B12 SERPL-MCNC: 861 PG/ML — SIGNIFICANT CHANGE UP (ref 243–894)
WBC # BLD: 7.5 K/UL — SIGNIFICANT CHANGE UP (ref 3.8–10.5)
WBC # FLD AUTO: 7.5 K/UL — SIGNIFICANT CHANGE UP (ref 3.8–10.5)

## 2017-05-07 PROCEDURE — 99232 SBSQ HOSP IP/OBS MODERATE 35: CPT | Mod: GC

## 2017-05-07 RX ADMIN — SODIUM CHLORIDE 75 MILLILITER(S): 9 INJECTION INTRAMUSCULAR; INTRAVENOUS; SUBCUTANEOUS at 23:11

## 2017-05-07 RX ADMIN — Medication 100 MILLIGRAM(S): at 13:39

## 2017-05-07 RX ADMIN — Medication 3 MILLILITER(S): at 08:33

## 2017-05-07 RX ADMIN — Medication 100 MILLIGRAM(S): at 23:12

## 2017-05-07 RX ADMIN — HEPARIN SODIUM 5000 UNIT(S): 5000 INJECTION INTRAVENOUS; SUBCUTANEOUS at 06:00

## 2017-05-07 RX ADMIN — HEPARIN SODIUM 5000 UNIT(S): 5000 INJECTION INTRAVENOUS; SUBCUTANEOUS at 13:39

## 2017-05-07 RX ADMIN — LATANOPROST 1 DROP(S): 0.05 SOLUTION/ DROPS OPHTHALMIC; TOPICAL at 23:11

## 2017-05-07 RX ADMIN — LISINOPRIL 10 MILLIGRAM(S): 2.5 TABLET ORAL at 06:00

## 2017-05-07 RX ADMIN — Medication 250 MILLIGRAM(S): at 07:03

## 2017-05-07 RX ADMIN — CEFEPIME 100 MILLIGRAM(S): 1 INJECTION, POWDER, FOR SOLUTION INTRAMUSCULAR; INTRAVENOUS at 06:00

## 2017-05-07 RX ADMIN — MEMANTINE HYDROCHLORIDE 10 MILLIGRAM(S): 10 TABLET ORAL at 18:08

## 2017-05-07 RX ADMIN — Medication 3 MILLILITER(S): at 20:13

## 2017-05-07 RX ADMIN — CEFEPIME 100 MILLIGRAM(S): 1 INJECTION, POWDER, FOR SOLUTION INTRAMUSCULAR; INTRAVENOUS at 18:07

## 2017-05-07 RX ADMIN — Medication 100 MILLIGRAM(S): at 06:00

## 2017-05-07 RX ADMIN — HEPARIN SODIUM 5000 UNIT(S): 5000 INJECTION INTRAVENOUS; SUBCUTANEOUS at 23:11

## 2017-05-07 RX ADMIN — MEMANTINE HYDROCHLORIDE 10 MILLIGRAM(S): 10 TABLET ORAL at 06:01

## 2017-05-07 RX ADMIN — Medication 3 MILLILITER(S): at 15:23

## 2017-05-08 ENCOUNTER — APPOINTMENT (OUTPATIENT)
Dept: HOME HEALTH SERVICES | Facility: HOME HEALTH | Age: 82
End: 2017-05-08

## 2017-05-08 LAB
ALBUMIN SERPL ELPH-MCNC: 2.1 G/DL — LOW (ref 3.5–5)
ALP SERPL-CCNC: 73 U/L — SIGNIFICANT CHANGE UP (ref 40–120)
ALT FLD-CCNC: 16 U/L DA — SIGNIFICANT CHANGE UP (ref 10–60)
ANION GAP SERPL CALC-SCNC: 3 MMOL/L — LOW (ref 5–17)
ANION GAP SERPL CALC-SCNC: 5 MMOL/L — SIGNIFICANT CHANGE UP (ref 5–17)
AST SERPL-CCNC: 19 U/L — SIGNIFICANT CHANGE UP (ref 10–40)
BASOPHILS # BLD AUTO: 0 K/UL — SIGNIFICANT CHANGE UP (ref 0–0.2)
BASOPHILS NFR BLD AUTO: 0.5 % — SIGNIFICANT CHANGE UP (ref 0–2)
BILIRUB SERPL-MCNC: 0.2 MG/DL — SIGNIFICANT CHANGE UP (ref 0.2–1.2)
BUN SERPL-MCNC: 19 MG/DL — HIGH (ref 7–18)
BUN SERPL-MCNC: 21 MG/DL — HIGH (ref 7–18)
CALCIUM SERPL-MCNC: 7.7 MG/DL — LOW (ref 8.4–10.5)
CALCIUM SERPL-MCNC: 8.3 MG/DL — LOW (ref 8.4–10.5)
CHLORIDE SERPL-SCNC: 111 MMOL/L — HIGH (ref 96–108)
CHLORIDE SERPL-SCNC: 112 MMOL/L — HIGH (ref 96–108)
CO2 SERPL-SCNC: 33 MMOL/L — HIGH (ref 22–31)
CO2 SERPL-SCNC: 36 MMOL/L — HIGH (ref 22–31)
CREAT SERPL-MCNC: 1.25 MG/DL — SIGNIFICANT CHANGE UP (ref 0.5–1.3)
CREAT SERPL-MCNC: 1.35 MG/DL — HIGH (ref 0.5–1.3)
EOSINOPHIL # BLD AUTO: 0.2 K/UL — SIGNIFICANT CHANGE UP (ref 0–0.5)
EOSINOPHIL NFR BLD AUTO: 2.1 % — SIGNIFICANT CHANGE UP (ref 0–6)
GLUCOSE SERPL-MCNC: 134 MG/DL — HIGH (ref 70–99)
GLUCOSE SERPL-MCNC: 161 MG/DL — HIGH (ref 70–99)
HCT VFR BLD CALC: 32 % — LOW (ref 39–50)
HGB BLD-MCNC: 9.6 G/DL — LOW (ref 13–17)
LYMPHOCYTES # BLD AUTO: 1.2 K/UL — SIGNIFICANT CHANGE UP (ref 1–3.3)
LYMPHOCYTES # BLD AUTO: 14.6 % — SIGNIFICANT CHANGE UP (ref 13–44)
MAGNESIUM SERPL-MCNC: 2.1 MG/DL — SIGNIFICANT CHANGE UP (ref 1.8–2.4)
MCHC RBC-ENTMCNC: 28.1 PG — SIGNIFICANT CHANGE UP (ref 27–34)
MCHC RBC-ENTMCNC: 30 GM/DL — LOW (ref 32–36)
MCV RBC AUTO: 93.8 FL — SIGNIFICANT CHANGE UP (ref 80–100)
MONOCYTES # BLD AUTO: 0.5 K/UL — SIGNIFICANT CHANGE UP (ref 0–0.9)
MONOCYTES NFR BLD AUTO: 6.4 % — SIGNIFICANT CHANGE UP (ref 2–14)
NEUTROPHILS # BLD AUTO: 6.3 K/UL — SIGNIFICANT CHANGE UP (ref 1.8–7.4)
NEUTROPHILS NFR BLD AUTO: 76.3 % — SIGNIFICANT CHANGE UP (ref 43–77)
PHOSPHATE SERPL-MCNC: 2.9 MG/DL — SIGNIFICANT CHANGE UP (ref 2.5–4.5)
PLATELET # BLD AUTO: 233 K/UL — SIGNIFICANT CHANGE UP (ref 150–400)
POTASSIUM SERPL-MCNC: 3.4 MMOL/L — LOW (ref 3.5–5.3)
POTASSIUM SERPL-MCNC: 4.1 MMOL/L — SIGNIFICANT CHANGE UP (ref 3.5–5.3)
POTASSIUM SERPL-SCNC: 3.4 MMOL/L — LOW (ref 3.5–5.3)
POTASSIUM SERPL-SCNC: 4.1 MMOL/L — SIGNIFICANT CHANGE UP (ref 3.5–5.3)
PROT SERPL-MCNC: 6.4 G/DL — SIGNIFICANT CHANGE UP (ref 6–8.3)
RBC # BLD: 3.41 M/UL — LOW (ref 4.2–5.8)
RBC # FLD: 14.6 % — HIGH (ref 10.3–14.5)
SODIUM SERPL-SCNC: 149 MMOL/L — HIGH (ref 135–145)
SODIUM SERPL-SCNC: 151 MMOL/L — HIGH (ref 135–145)
WBC # BLD: 8.2 K/UL — SIGNIFICANT CHANGE UP (ref 3.8–10.5)
WBC # FLD AUTO: 8.2 K/UL — SIGNIFICANT CHANGE UP (ref 3.8–10.5)

## 2017-05-08 PROCEDURE — 71010: CPT | Mod: 26

## 2017-05-08 PROCEDURE — 99232 SBSQ HOSP IP/OBS MODERATE 35: CPT | Mod: GC

## 2017-05-08 RX ORDER — SODIUM CHLORIDE 9 MG/ML
1000 INJECTION, SOLUTION INTRAVENOUS
Qty: 0 | Refills: 0 | Status: DISCONTINUED | OUTPATIENT
Start: 2017-05-08 | End: 2017-05-10

## 2017-05-08 RX ORDER — AMLODIPINE BESYLATE 2.5 MG/1
2.5 TABLET ORAL ONCE
Qty: 0 | Refills: 0 | Status: COMPLETED | OUTPATIENT
Start: 2017-05-08 | End: 2017-05-08

## 2017-05-08 RX ORDER — SODIUM CHLORIDE 9 MG/ML
1000 INJECTION, SOLUTION INTRAVENOUS
Qty: 0 | Refills: 0 | Status: DISCONTINUED | OUTPATIENT
Start: 2017-05-08 | End: 2017-05-08

## 2017-05-08 RX ORDER — POTASSIUM CHLORIDE 20 MEQ
40 PACKET (EA) ORAL ONCE
Qty: 0 | Refills: 0 | Status: COMPLETED | OUTPATIENT
Start: 2017-05-08 | End: 2017-05-08

## 2017-05-08 RX ADMIN — CEFEPIME 100 MILLIGRAM(S): 1 INJECTION, POWDER, FOR SOLUTION INTRAMUSCULAR; INTRAVENOUS at 07:38

## 2017-05-08 RX ADMIN — CEFEPIME 100 MILLIGRAM(S): 1 INJECTION, POWDER, FOR SOLUTION INTRAMUSCULAR; INTRAVENOUS at 18:53

## 2017-05-08 RX ADMIN — Medication 100 MILLIGRAM(S): at 22:44

## 2017-05-08 RX ADMIN — SODIUM CHLORIDE 75 MILLILITER(S): 9 INJECTION INTRAMUSCULAR; INTRAVENOUS; SUBCUTANEOUS at 07:27

## 2017-05-08 RX ADMIN — MEMANTINE HYDROCHLORIDE 10 MILLIGRAM(S): 10 TABLET ORAL at 18:53

## 2017-05-08 RX ADMIN — Medication 250 MILLIGRAM(S): at 07:31

## 2017-05-08 RX ADMIN — Medication 100 MILLIGRAM(S): at 06:00

## 2017-05-08 RX ADMIN — LATANOPROST 1 DROP(S): 0.05 SOLUTION/ DROPS OPHTHALMIC; TOPICAL at 22:44

## 2017-05-08 RX ADMIN — HEPARIN SODIUM 5000 UNIT(S): 5000 INJECTION INTRAVENOUS; SUBCUTANEOUS at 22:44

## 2017-05-08 RX ADMIN — Medication 100 MILLIGRAM(S): at 13:30

## 2017-05-08 RX ADMIN — Medication 3 MILLILITER(S): at 14:24

## 2017-05-08 RX ADMIN — SODIUM CHLORIDE 80 MILLILITER(S): 9 INJECTION, SOLUTION INTRAVENOUS at 18:46

## 2017-05-08 RX ADMIN — Medication 3 MILLILITER(S): at 08:12

## 2017-05-08 RX ADMIN — LISINOPRIL 10 MILLIGRAM(S): 2.5 TABLET ORAL at 07:30

## 2017-05-08 RX ADMIN — SODIUM CHLORIDE 100 MILLILITER(S): 9 INJECTION, SOLUTION INTRAVENOUS at 11:18

## 2017-05-08 RX ADMIN — HEPARIN SODIUM 5000 UNIT(S): 5000 INJECTION INTRAVENOUS; SUBCUTANEOUS at 07:29

## 2017-05-08 RX ADMIN — Medication 3 MILLILITER(S): at 20:27

## 2017-05-08 RX ADMIN — HEPARIN SODIUM 5000 UNIT(S): 5000 INJECTION INTRAVENOUS; SUBCUTANEOUS at 13:30

## 2017-05-08 RX ADMIN — MEMANTINE HYDROCHLORIDE 10 MILLIGRAM(S): 10 TABLET ORAL at 07:29

## 2017-05-08 RX ADMIN — AMLODIPINE BESYLATE 2.5 MILLIGRAM(S): 2.5 TABLET ORAL at 12:21

## 2017-05-08 RX ADMIN — Medication 40 MILLIEQUIVALENT(S): at 20:30

## 2017-05-09 ENCOUNTER — TRANSCRIPTION ENCOUNTER (OUTPATIENT)
Age: 82
End: 2017-05-09

## 2017-05-09 LAB
ANION GAP SERPL CALC-SCNC: 6 MMOL/L — SIGNIFICANT CHANGE UP (ref 5–17)
ANION GAP SERPL CALC-SCNC: 6 MMOL/L — SIGNIFICANT CHANGE UP (ref 5–17)
BUN SERPL-MCNC: 20 MG/DL — HIGH (ref 7–18)
BUN SERPL-MCNC: 21 MG/DL — HIGH (ref 7–18)
CALCIUM SERPL-MCNC: 8.1 MG/DL — LOW (ref 8.4–10.5)
CALCIUM SERPL-MCNC: 8.2 MG/DL — LOW (ref 8.4–10.5)
CHLORIDE SERPL-SCNC: 109 MMOL/L — HIGH (ref 96–108)
CHLORIDE SERPL-SCNC: 110 MMOL/L — HIGH (ref 96–108)
CO2 SERPL-SCNC: 32 MMOL/L — HIGH (ref 22–31)
CO2 SERPL-SCNC: 33 MMOL/L — HIGH (ref 22–31)
CREAT SERPL-MCNC: 1.22 MG/DL — SIGNIFICANT CHANGE UP (ref 0.5–1.3)
CREAT SERPL-MCNC: 1.23 MG/DL — SIGNIFICANT CHANGE UP (ref 0.5–1.3)
GLUCOSE SERPL-MCNC: 122 MG/DL — HIGH (ref 70–99)
GLUCOSE SERPL-MCNC: 147 MG/DL — HIGH (ref 70–99)
POTASSIUM SERPL-MCNC: 3.4 MMOL/L — LOW (ref 3.5–5.3)
POTASSIUM SERPL-MCNC: 3.5 MMOL/L — SIGNIFICANT CHANGE UP (ref 3.5–5.3)
POTASSIUM SERPL-SCNC: 3.4 MMOL/L — LOW (ref 3.5–5.3)
POTASSIUM SERPL-SCNC: 3.5 MMOL/L — SIGNIFICANT CHANGE UP (ref 3.5–5.3)
SODIUM SERPL-SCNC: 148 MMOL/L — HIGH (ref 135–145)
SODIUM SERPL-SCNC: 148 MMOL/L — HIGH (ref 135–145)

## 2017-05-09 PROCEDURE — 99232 SBSQ HOSP IP/OBS MODERATE 35: CPT | Mod: GC

## 2017-05-09 RX ORDER — DOCUSATE SODIUM 100 MG
100 CAPSULE ORAL
Qty: 0 | Refills: 0 | Status: DISCONTINUED | OUTPATIENT
Start: 2017-05-09 | End: 2017-05-15

## 2017-05-09 RX ORDER — SENNA PLUS 8.6 MG/1
2 TABLET ORAL AT BEDTIME
Qty: 0 | Refills: 0 | Status: DISCONTINUED | OUTPATIENT
Start: 2017-05-09 | End: 2017-05-15

## 2017-05-09 RX ADMIN — SENNA PLUS 2 TABLET(S): 8.6 TABLET ORAL at 22:17

## 2017-05-09 RX ADMIN — HEPARIN SODIUM 5000 UNIT(S): 5000 INJECTION INTRAVENOUS; SUBCUTANEOUS at 13:22

## 2017-05-09 RX ADMIN — CEFEPIME 100 MILLIGRAM(S): 1 INJECTION, POWDER, FOR SOLUTION INTRAMUSCULAR; INTRAVENOUS at 17:52

## 2017-05-09 RX ADMIN — Medication 100 MILLIGRAM(S): at 13:22

## 2017-05-09 RX ADMIN — MEMANTINE HYDROCHLORIDE 10 MILLIGRAM(S): 10 TABLET ORAL at 06:37

## 2017-05-09 RX ADMIN — LISINOPRIL 10 MILLIGRAM(S): 2.5 TABLET ORAL at 06:37

## 2017-05-09 RX ADMIN — Medication 100 MILLIGRAM(S): at 06:36

## 2017-05-09 RX ADMIN — Medication 3 MILLILITER(S): at 20:17

## 2017-05-09 RX ADMIN — HEPARIN SODIUM 5000 UNIT(S): 5000 INJECTION INTRAVENOUS; SUBCUTANEOUS at 06:37

## 2017-05-09 RX ADMIN — Medication 250 MILLIGRAM(S): at 07:32

## 2017-05-09 RX ADMIN — LATANOPROST 1 DROP(S): 0.05 SOLUTION/ DROPS OPHTHALMIC; TOPICAL at 22:17

## 2017-05-09 RX ADMIN — MEMANTINE HYDROCHLORIDE 10 MILLIGRAM(S): 10 TABLET ORAL at 17:52

## 2017-05-09 RX ADMIN — Medication 100 MILLIGRAM(S): at 17:52

## 2017-05-09 RX ADMIN — Medication 100 MILLIGRAM(S): at 22:17

## 2017-05-09 RX ADMIN — Medication 3 MILLILITER(S): at 14:55

## 2017-05-09 RX ADMIN — Medication 3 MILLILITER(S): at 02:30

## 2017-05-09 RX ADMIN — Medication 3 MILLILITER(S): at 08:16

## 2017-05-09 RX ADMIN — CEFEPIME 100 MILLIGRAM(S): 1 INJECTION, POWDER, FOR SOLUTION INTRAMUSCULAR; INTRAVENOUS at 06:36

## 2017-05-09 RX ADMIN — HEPARIN SODIUM 5000 UNIT(S): 5000 INJECTION INTRAVENOUS; SUBCUTANEOUS at 22:17

## 2017-05-09 NOTE — DISCHARGE NOTE ADULT - CARE PLAN
Principal Discharge DX:	Sepsis  Goal:	Prevent reoccurence  Instructions for follow-up, activity and diet:	Sepsis secondary to aspiration pneumonia. Complete the course of antibiotics as prescribed. Prevent aspiration and keep head end elevated while feeding the patient.  Secondary Diagnosis:	HTN (hypertension)  Goal:	Goal BP <150/90  Instructions for follow-up, activity and diet:	Continue lisinopril and monitor BP.  Secondary Diagnosis:	Paroxysmal atrial fibrillation  Goal:	Keep HR under control  Instructions for follow-up, activity and diet:	Continue Vancomycin to treat endocarditis till 05/13/17  Secondary Diagnosis:	BPH (benign prostatic hypertrophy) with urinary retention  Goal:	Avoid infection in urine  Instructions for follow-up, activity and diet:	Patient has chronic Barr's, recommend to change Barr regularly as per primary care doctor.  Secondary Diagnosis:	Alzheimer disease  Goal:	Prevent progression of dementia  Instructions for follow-up, activity and diet:	Continue Namenda  Secondary Diagnosis:	CHARLIE (acute kidney injury)  Goal:	Resolved  Instructions for follow-up, activity and diet:	Stay well hydrated and avoid dehydration Principal Discharge DX:	Sepsis  Goal:	Prevent reoccurrence  Instructions for follow-up, activity and diet:	Sepsis secondary to aspiration pneumonia. Patient completed the course of antibiotics in hospital as prescribed. Prevent aspiration and keep head end elevated while feeding the patient.  Secondary Diagnosis:	HTN (hypertension)  Goal:	Goal BP <150/90  Instructions for follow-up, activity and diet:	Continue lisinopril and monitor BP.  Secondary Diagnosis:	Paroxysmal atrial fibrillation  Goal:	Keep HR under control  Instructions for follow-up, activity and diet:	Completed Vancomycin treatment to treat endocarditis till 05/13/17. Dr Walsh was informed about the plan.  Secondary Diagnosis:	BPH (benign prostatic hypertrophy) with urinary retention  Goal:	Avoid infection in urine  Instructions for follow-up, activity and diet:	Patient has chronic Barr's, recommend to change Barr regularly as per primary care doctor.  Secondary Diagnosis:	Alzheimer disease  Goal:	Prevent progression of dementia  Instructions for follow-up, activity and diet:	Continue Namenda  Secondary Diagnosis:	CHARLIE (acute kidney injury)  Goal:	Resolved  Instructions for follow-up, activity and diet:	Stay well hydrated and avoid dehydration

## 2017-05-09 NOTE — DISCHARGE NOTE ADULT - HOSPITAL COURSE
Patient is a 86 year-old male, history of dementia , bed bound and non verbal, paroxysmal atrial fibrillation not on anticoagulation , HTN, glaucoma, BPH was brought in by son as he was acutely short of breath, tachypneic and wheezing when son who is a physician examined him this morning . Son also reports that patient felt warm and has had productive cough and has been lethargic since he was discharged from the hospital a week ago.     Sepsis.   Secondary to uti versus aspiration pna  Patient is already being empirically treated with vancomycin for endocarditis.   Maxipime and Flagyl (started 05/06/17) for aspiration coverage  No more fevers. CXR 05/08/2017 shows worsening pneumonia.  Son refused the speech and swallow consult, wants to continue dysphagia diet.   Patient also has a PICC that was placed for long term antibiotics 4/23 in [facility], which will need to be changed if patient continues spike fevers.   Hypernatramia: Continue DS at 80 ml/min and f/u BMP in AM     CHARLIE (acute kidney injury).     Cr on admission is 1.36  Resolved with iv fluids  Likely pre-renal secondary to dehydration &UTI . Could also be secondary to vancomycin or lisinopril both newly started on hospital discharge last week  Normal baseline    Paroxysmal atrial fibrillation.    No anticoagulation  TTE from previous admission last week showing: EF 70% and thickened mitral valve that is incompletely visualized     Alzheimer disease.   patient at mental baseline per son at bedside   Continue memantine, aspiration precautions  fall precautions.     BPH  with urinary retention.     continue with chronic carlson.     HTN   BP stable  Continue home dose Lisinopril Patient is a 86 year-old male, history of dementia , bed bound and non verbal, paroxysmal atrial fibrillation not on anticoagulation , HTN, glaucoma, BPH was brought in by son as he was acutely short of breath, tachypneic and wheezing when son who is a physician examined him this morning . Son also reports that patient felt warm and has had productive cough and has been lethargic since he was discharged from the hospital a week ago.     Sepsis.   Secondary to uti versus aspiration pna  Patient is already being empirically treated with vancomycin for endocarditis.   Completed Maxipime and Flagyl (started 05/06/17) for aspiration coverage  No more fevers.  Son refused the speech and swallow consult, wants to continue dysphagia diet.   Patient also has a PICC that was placed for long term antibiotics 4/23 in [facility], PICC line was removed on discharge.    Hypernatremia: Continue DS at 80 ml/min and f/u BMP in AM     CHARLIE (acute kidney injury).     Cr on admission is 1.36  Resolved with iv fluids  Likely pre-renal secondary to dehydration &UTI . Could also be secondary to vancomycin or lisinopril both newly started on hospital discharge last week  Normal baseline    Paroxysmal atrial fibrillation.    No anticoagulation  TTE from previous admission last week showing: EF 70% and thickened mitral valve that is incompletely visualized     Alzheimer disease.   patient at mental baseline per son at bedside   Continue memantine, aspiration precautions  fall precautions.     BPH  with urinary retention.     continue with chronic carlson.     HTN   BP stable  Continue home dose Lisinopril Patient is a 86 year-old male, history of dementia , bed bound and non verbal, paroxysmal atrial fibrillation not on anticoagulation , HTN, glaucoma, BPH was brought in by son as he was acutely short of breath, tachypneic and wheezing when son who is a physician examined him this morning . Son also reports that patient felt warm and has had productive cough and has been lethargic since he was discharged from the hospital a week ago.     Sepsis.   Secondary to  aspiration pna  Patient is already being empirically treated with vancomycin for endocarditis.   Completed Maxipime and Flagyl (started 05/06/17) for aspiration coverage  No more fevers.  Son refused the speech and swallow consult, wants to continue dysphagia diet.   Patient also has a PICC that was placed for long term antibiotics 4/23 in [facility], PICC line was removed on discharge as patient completed antibiotic course.    Hypernatremia: Continue DS at 80 ml/min and f/u BMP in AM     CHARLIE (acute kidney injury).     Cr on admission is 1.36  Resolved with iv fluids  Likely pre-renal secondary to dehydration &UTI . Could also be secondary to vancomycin or lisinopril both newly started on hospital discharge last week  Normal baseline    Paroxysmal atrial fibrillation.    No anticoagulation  TTE from previous admission last week showing: EF 70% and thickened mitral valve that is incompletely visualized     Alzheimer disease.   patient at mental baseline per son at bedside   Continue memantine, aspiration precautions  fall precautions.     BPH  with urinary retention.     continue with chronic carlson.     HTN   BP stable  Continue home dose Lisinopril .    ATTENDING ADDENDUM  The hospital course of this patient was uncomplicated. He was treated for aspiration PNA with sepsis on admission. He completed antibiotic course while in the hospital. 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 He completed course of IV Vanco while admitted and PICC line removed prior to discharge  He is discharged in stable condition with home care services and resumption of private HHA.

## 2017-05-09 NOTE — DISCHARGE NOTE ADULT - MEDICATION SUMMARY - MEDICATIONS TO TAKE
I will START or STAY ON the medications listed below when I get home from the hospital:    lisinopril 10 mg oral tablet  -- 1 tab(s) by mouth once a day  -- Indication: For HTN (hypertension)    memantine 10 mg oral tablet  -- 1 tab(s) by mouth 2 times a day  -- Indication: For Dementia    latanoprost 0.005% ophthalmic solution  -- 1 drop(s) in each eye once a day (in the evening)  -- Indication: For Topical ointment    multivitamin  -- 1 tab(s) by mouth once a day  -- Indication: For Supplements

## 2017-05-09 NOTE — DISCHARGE NOTE ADULT - PLAN OF CARE
Resolved Stay well hydrated and avoid dehydration Prevent progression of dementia Continue Namenda Patient has chronic Barr's, recommend to change Barr regularly as per primary care doctor. Avoid infection in urine Keep HR under control Continue Vancomycin to treat endocarditis till 05/13/17 Goal BP <150/90 Continue lisinopril and monitor BP. Sepsis secondary to aspiration pneumonia. Complete the course of antibiotics as prescribed. Prevent aspiration and keep head end elevated while feeding the patient. Prevent reoccurence Completed Vancomycin treatment to treat endocarditis till 05/13/17. Dr Walsh was informed about the plan. Sepsis secondary to aspiration pneumonia. Patient completed the course of antibiotics in hospital as prescribed. Prevent aspiration and keep head end elevated while feeding the patient. Prevent reoccurrence

## 2017-05-09 NOTE — DISCHARGE NOTE ADULT - MEDICATION SUMMARY - MEDICATIONS TO STOP TAKING
I will STOP taking the medications listed below when I get home from the hospital:    vancomycin 1 g intravenous injection  -- 1 gram(s) intravenous every 24 hours pt followed by Dr. Viola Walsh

## 2017-05-09 NOTE — DISCHARGE NOTE ADULT - SECONDARY DIAGNOSIS.
HTN (hypertension) Paroxysmal atrial fibrillation BPH (benign prostatic hypertrophy) with urinary retention Alzheimer disease CHARLIE (acute kidney injury)

## 2017-05-09 NOTE — DISCHARGE NOTE ADULT - PATIENT PORTAL LINK FT
“You can access the [facility] Patient Portal, offered by [facility], by registering with the following website: [email or web address]”

## 2017-05-10 DIAGNOSIS — Z29.9 ENCOUNTER FOR PROPHYLACTIC MEASURES, UNSPECIFIED: ICD-10-CM

## 2017-05-10 DIAGNOSIS — J69.0 PNEUMONITIS DUE TO INHALATION OF FOOD AND VOMIT: ICD-10-CM

## 2017-05-10 LAB
ANION GAP SERPL CALC-SCNC: 4 MMOL/L — LOW (ref 5–17)
BUN SERPL-MCNC: 22 MG/DL — HIGH (ref 7–18)
CALCIUM SERPL-MCNC: 8.2 MG/DL — LOW (ref 8.4–10.5)
CHLORIDE SERPL-SCNC: 109 MMOL/L — HIGH (ref 96–108)
CO2 SERPL-SCNC: 31 MMOL/L — SIGNIFICANT CHANGE UP (ref 22–31)
CREAT SERPL-MCNC: 1.09 MG/DL — SIGNIFICANT CHANGE UP (ref 0.5–1.3)
GLUCOSE SERPL-MCNC: 104 MG/DL — HIGH (ref 70–99)
HCT VFR BLD CALC: 31.1 % — LOW (ref 39–50)
HGB BLD-MCNC: 9.9 G/DL — LOW (ref 13–17)
MAGNESIUM SERPL-MCNC: 1.7 MG/DL — LOW (ref 1.8–2.4)
MCHC RBC-ENTMCNC: 28.8 PG — SIGNIFICANT CHANGE UP (ref 27–34)
MCHC RBC-ENTMCNC: 31.8 GM/DL — LOW (ref 32–36)
MCV RBC AUTO: 90.6 FL — SIGNIFICANT CHANGE UP (ref 80–100)
PHOSPHATE SERPL-MCNC: 2 MG/DL — LOW (ref 2.5–4.5)
PLATELET # BLD AUTO: 222 K/UL — SIGNIFICANT CHANGE UP (ref 150–400)
POTASSIUM SERPL-MCNC: 3.6 MMOL/L — SIGNIFICANT CHANGE UP (ref 3.5–5.3)
POTASSIUM SERPL-SCNC: 3.6 MMOL/L — SIGNIFICANT CHANGE UP (ref 3.5–5.3)
RBC # BLD: 3.44 M/UL — LOW (ref 4.2–5.8)
RBC # FLD: 14.6 % — HIGH (ref 10.3–14.5)
SODIUM SERPL-SCNC: 144 MMOL/L — SIGNIFICANT CHANGE UP (ref 135–145)
VANCOMYCIN TROUGH SERPL-MCNC: 19.9 UG/ML — SIGNIFICANT CHANGE UP (ref 10–20)
WBC # BLD: 8.5 K/UL — SIGNIFICANT CHANGE UP (ref 3.8–10.5)
WBC # FLD AUTO: 8.5 K/UL — SIGNIFICANT CHANGE UP (ref 3.8–10.5)

## 2017-05-10 PROCEDURE — 99232 SBSQ HOSP IP/OBS MODERATE 35: CPT | Mod: GC

## 2017-05-10 RX ORDER — MAGNESIUM SULFATE 500 MG/ML
1 VIAL (ML) INJECTION ONCE
Qty: 0 | Refills: 0 | Status: COMPLETED | OUTPATIENT
Start: 2017-05-10 | End: 2017-05-10

## 2017-05-10 RX ORDER — POTASSIUM PHOSPHATE, MONOBASIC POTASSIUM PHOSPHATE, DIBASIC 236; 224 MG/ML; MG/ML
15 INJECTION, SOLUTION INTRAVENOUS ONCE
Qty: 0 | Refills: 0 | Status: COMPLETED | OUTPATIENT
Start: 2017-05-10 | End: 2017-05-10

## 2017-05-10 RX ADMIN — POTASSIUM PHOSPHATE, MONOBASIC POTASSIUM PHOSPHATE, DIBASIC 62.5 MILLIMOLE(S): 236; 224 INJECTION, SOLUTION INTRAVENOUS at 14:23

## 2017-05-10 RX ADMIN — Medication 3 MILLILITER(S): at 20:38

## 2017-05-10 RX ADMIN — SENNA PLUS 2 TABLET(S): 8.6 TABLET ORAL at 21:28

## 2017-05-10 RX ADMIN — Medication 3 MILLILITER(S): at 15:36

## 2017-05-10 RX ADMIN — MEMANTINE HYDROCHLORIDE 10 MILLIGRAM(S): 10 TABLET ORAL at 18:59

## 2017-05-10 RX ADMIN — Medication 100 MILLIGRAM(S): at 21:27

## 2017-05-10 RX ADMIN — Medication 100 MILLIGRAM(S): at 05:59

## 2017-05-10 RX ADMIN — HEPARIN SODIUM 5000 UNIT(S): 5000 INJECTION INTRAVENOUS; SUBCUTANEOUS at 18:59

## 2017-05-10 RX ADMIN — CEFEPIME 100 MILLIGRAM(S): 1 INJECTION, POWDER, FOR SOLUTION INTRAMUSCULAR; INTRAVENOUS at 05:59

## 2017-05-10 RX ADMIN — Medication 100 MILLIGRAM(S): at 14:30

## 2017-05-10 RX ADMIN — CEFEPIME 100 MILLIGRAM(S): 1 INJECTION, POWDER, FOR SOLUTION INTRAMUSCULAR; INTRAVENOUS at 18:57

## 2017-05-10 RX ADMIN — HEPARIN SODIUM 5000 UNIT(S): 5000 INJECTION INTRAVENOUS; SUBCUTANEOUS at 05:59

## 2017-05-10 RX ADMIN — Medication 100 MILLIGRAM(S): at 18:59

## 2017-05-10 RX ADMIN — LISINOPRIL 10 MILLIGRAM(S): 2.5 TABLET ORAL at 06:49

## 2017-05-10 RX ADMIN — Medication 250 MILLIGRAM(S): at 08:48

## 2017-05-10 RX ADMIN — MEMANTINE HYDROCHLORIDE 10 MILLIGRAM(S): 10 TABLET ORAL at 05:59

## 2017-05-10 RX ADMIN — SODIUM CHLORIDE 80 MILLILITER(S): 9 INJECTION, SOLUTION INTRAVENOUS at 05:59

## 2017-05-10 RX ADMIN — LATANOPROST 1 DROP(S): 0.05 SOLUTION/ DROPS OPHTHALMIC; TOPICAL at 21:27

## 2017-05-10 RX ADMIN — Medication 100 GRAM(S): at 11:13

## 2017-05-10 RX ADMIN — Medication 3 MILLILITER(S): at 09:51

## 2017-05-10 RX ADMIN — Medication 100 MILLIGRAM(S): at 06:00

## 2017-05-10 RX ADMIN — HEPARIN SODIUM 5000 UNIT(S): 5000 INJECTION INTRAVENOUS; SUBCUTANEOUS at 21:27

## 2017-05-10 NOTE — PROGRESS NOTE ADULT - ASSESSMENT
1) Bilateral pneumonia (aspiration)  continue Maxipime 500mg IV q12h           ( DC all antibiotics on 5/14 )  continue Flagyl 500 IV q8h  2) History of Bacteremia (?endocarditis)   continue Vanco 1gm IV Daily

## 2017-05-10 NOTE — PROGRESS NOTE ADULT - SUBJECTIVE AND OBJECTIVE BOX
Patient is a 86y old  Male who presents with a chief complaint of Dyspnea , fever and cough (09 May 2017 16:17)      INTERVAL HPI/OVERNIGHT EVENTS: NO overnight events, patient is still confused but can speak a little bit. Denies chest pain, SOB palpitation on interaction.   T(C): 37, Max: 37.4 (05-09 @ 13:53)  HR: 89 (89 - 96)  BP: 158/84 (139/72 - 158/84)  RR: 18 (16 - 18)  SpO2: 100% (96% - 100%)      REVIEW OF SYSTEMS:  CONSTITUTIONAL: No fever, weight loss, or fatigue  EYES: No eye pain, visual disturbances, or discharge  ENMT:  No difficulty hearing, tinnitus, vertigo; No sinus or throat pain  NECK: No pain or stiffness  BREASTS: No pain, masses, or nipple discharge  RESPIRATORY: No cough, wheezing, chills or hemoptysis; No shortness of breath  CARDIOVASCULAR: No chest pain, palpitations, dizziness, or leg swelling  GASTROINTESTINAL: No abdominal or epigastric pain. No nausea, vomiting, or hematemesis; No diarrhea or constipation. No melena or hematochezia.  GENITOURINARY: No dysuria, frequency, hematuria, or incontinence  NEUROLOGICAL: No headaches, memory loss, loss of strength, weakness, numbness, or tremors  SKIN: No itching, burning, rashes, or lesions   LYMPH NODES: No enlarged glands  ENDOCRINE: No heat or cold intolerance; No hair loss  MUSCULOSKELETAL: No joint pain or swelling; No muscle, back, or extremity pain  PSYCHIATRIC: No depression, anxiety, mood swings, or difficulty sleeping  HEME/LYMPH: No easy bruising, or bleeding gums  ALLERY AND IMMUNOLOGIC: No hives or eczema      PHYSICAL EXAM:  GENERAL: NAD, well-groomed, well-developed  HEAD:  Atraumatic, Normocephalic  EYES: EOMI, PERRLA, conjunctiva and sclera clear  ENMT: No tonsillar erythema, exudates, or enlargement; Moist mucous membranes, Good dentition, No lesions  NECK: Supple, No JVD, Normal thyroid  NERVOUS SYSTEM:  Alert & Oriented X0, motor Strength 5/5 B/L upper and lower extremities; DTRs 2+ intact and symmetric  CHEST/LUNG: No rales, b/l rhonchi, no wheezing, or rubs  HEART: irregular rate and rhythm; No murmurs, rubs, or gallops  ABDOMEN: Soft, Nontender, Nondistended; Bowel sounds present  EXTREMITIES:  2+ Peripheral Pulses, No clubbing, cyanosis, or edema  LYMPH: No lymphadenopathy noted  SKIN: No rashes or lesions    LABS:                        9.9    8.5   )-----------( 222      ( 10 May 2017 06:15 )             31.1     05-10    144  |  109<H>  |  22<H>  ----------------------------<  104<H>  3.6   |  31  |  1.09    Ca    8.2<L>      10 May 2017 06:15  Phos  2.0     05-10  Mg     1.7     05-10          CAPILLARY BLOOD GLUCOSE  123 (10 May 2017 07:55)  137 (09 May 2017 21:52)  173 (09 May 2017 16:14)            Radiology: Patient is a 86y old  Male who presents with a chief complaint of Dyspnea , fever and cough (09 May 2017 16:17)      INTERVAL HPI/OVERNIGHT EVENTS: NO overnight events, patient is still confused but can speak a little bit. Denies chest pain, SOB palpitation on interaction.   T(C): 37, Max: 37.4 (05-09 @ 13:53)  HR: 89 (89 - 96)  BP: 158/84 (139/72 - 158/84)  RR: 18 (16 - 18)  SpO2: 100% (96% - 100%)      REVIEW OF SYSTEMS:  RESPIRATORY: No cough, wheezing, chills or hemoptysis; No shortness of breath  CARDIOVASCULAR: No chest pain, palpitations, dizziness, or leg swelling  GASTROINTESTINAL: No abdominal or epigastric pain. No nausea, vomiting, or hematemesis; No diarrhea or constipation. No melena or hematochezia.      PHYSICAL EXAM:  GENERAL: NAD, well-groomed, well-developed  NECK: Supple, No JVD, Normal thyroid  NERVOUS SYSTEM:  Alert & Oriented X0,   CHEST/LUNG: No rales, b/l rhonchi, no wheezing, or rubs  HEART: irregular rate and rhythm; No murmurs, rubs, or gallops  ABDOMEN: Soft, Nontender, Nondistended; Bowel sounds present  EXTREMITIES:  2+ Peripheral Pulses, No clubbing, cyanosis, or edema      LABS:                        9.9    8.5   )-----------( 222      ( 10 May 2017 06:15 )             31.1     05-10    144  |  109<H>  |  22<H>  ----------------------------<  104<H>  3.6   |  31  |  1.09    Ca    8.2<L>      10 May 2017 06:15  Phos  2.0     05-10  Mg     1.7     05-10          CAPILLARY BLOOD GLUCOSE  123 (10 May 2017 07:55)  137 (09 May 2017 21:52)  173 (09 May 2017 16:14)            Radiology:

## 2017-05-10 NOTE — PROGRESS NOTE ADULT - SUBJECTIVE AND OBJECTIVE BOX
86y male is currently under our care for bilateral pneumonia likely from aspiration plus h/o ?endocarditis.  Currently patient looks better and is not in any respiratory distress. Has not had any fevers and WBC count is normal.    Meds:  vancomycin  IVPB 1000milliGRAM(s) IV Intermittent every 24 hours  cefepime  IVPB 500milliGRAM(s) IV Intermittent every 12 hours  metroNIDAZOLE  IVPB 500milliGRAM(s) IV Intermittent every 8 hours    Allergies: No Known Allergies    ROS:  [ X ] UNABLE TO ELICIT  General:  [ N ] Fever      PHYSICAL EXAM:    Vital Signs Last 24 Hrs  T(C): 36.7, Max: 37.2 (05-09 @ 21:23)  T(F): 98.1, Max: 98.9 (05-09 @ 21:23)  HR: 98 (89 - 98)  BP: 154/81 (140/84 - 158/84)  BP(mean): --  RR: 18 (16 - 18)  SpO2: 98% (96% - 100%)    HEENT: +NC @2L/min    Neck:  [ N ] Supple - arthritic neck     CHEST/Respiratory:  [ N ] Rales      [ N ] Rhonchi      [ N ] Wheezing   - decreased bilateral BS    Cardiovascular:  [ + ] S1 S2  [ + ] Reg     Gastrointestinal:  Bowel Sounds: [ Y  ] ABD Soft  [ N ] ABD Distention  [ N ] Tenderness  - Barr with clear yellow urine    Extremities: [ N ] Edema   - right arm PICC line - left forearm IV site Ok    Neurological:  [ Y ] Alert  [ Y ] Awake  [ N ] Oriented      Skin:   [ N ] Rashes      LABS/DIAGNOSTIC TESTS                          9.9    8.5   )-----------( 222      ( 10 May 2017 06:15 )             31.1         05-10    144  |  109<H>  |  22<H>  ----------------------------<  104<H>  3.6   |  31  |  1.09    Ca    8.2<L>      10 May 2017 06:15  Phos  2.0     05-10  Mg     1.7     05-10      Vancomycin Level, Trough (05.10.17 @ 06:15)  Vancomycin Level, Trough: 19.9

## 2017-05-10 NOTE — PROGRESS NOTE ADULT - PROBLEM SELECTOR PLAN 1
Maxipime and Flagyl (05/06/17) for aspiration coverage,   needs iv antibiotics till Sunday as pe ID Dr Britton  No more fevers, leukocytosis resolved   DC plan Sunday if patient remain a febrile

## 2017-05-10 NOTE — PROGRESS NOTE ADULT - ASSESSMENT
Patient is a 86 year-old male, history of dementia , bed bound and non verbal, paroxysmal atrial fibrillation not on anticoagulation , HTN, glaucoma, BPH was brought in by son as he was acutely short of breath, tachypneic and wheezing when son who is a physician examined him this morning . Son also reports that patient felt warm and has had productive cough and has been lethargic since he was discharged from the hospital a week ago.

## 2017-05-11 LAB
CULTURE RESULTS: SIGNIFICANT CHANGE UP
CULTURE RESULTS: SIGNIFICANT CHANGE UP
SPECIMEN SOURCE: SIGNIFICANT CHANGE UP
SPECIMEN SOURCE: SIGNIFICANT CHANGE UP

## 2017-05-11 PROCEDURE — 99232 SBSQ HOSP IP/OBS MODERATE 35: CPT | Mod: GC

## 2017-05-11 RX ADMIN — Medication 100 MILLIGRAM(S): at 23:25

## 2017-05-11 RX ADMIN — HEPARIN SODIUM 5000 UNIT(S): 5000 INJECTION INTRAVENOUS; SUBCUTANEOUS at 05:12

## 2017-05-11 RX ADMIN — Medication 100 MILLIGRAM(S): at 17:53

## 2017-05-11 RX ADMIN — HEPARIN SODIUM 5000 UNIT(S): 5000 INJECTION INTRAVENOUS; SUBCUTANEOUS at 23:24

## 2017-05-11 RX ADMIN — Medication 100 MILLIGRAM(S): at 18:46

## 2017-05-11 RX ADMIN — Medication 100 MILLIGRAM(S): at 05:12

## 2017-05-11 RX ADMIN — MEMANTINE HYDROCHLORIDE 10 MILLIGRAM(S): 10 TABLET ORAL at 05:12

## 2017-05-11 RX ADMIN — Medication 3 MILLILITER(S): at 09:32

## 2017-05-11 RX ADMIN — HEPARIN SODIUM 5000 UNIT(S): 5000 INJECTION INTRAVENOUS; SUBCUTANEOUS at 18:46

## 2017-05-11 RX ADMIN — CEFEPIME 100 MILLIGRAM(S): 1 INJECTION, POWDER, FOR SOLUTION INTRAMUSCULAR; INTRAVENOUS at 05:13

## 2017-05-11 RX ADMIN — LATANOPROST 1 DROP(S): 0.05 SOLUTION/ DROPS OPHTHALMIC; TOPICAL at 23:24

## 2017-05-11 RX ADMIN — MEMANTINE HYDROCHLORIDE 10 MILLIGRAM(S): 10 TABLET ORAL at 18:47

## 2017-05-11 RX ADMIN — CEFEPIME 100 MILLIGRAM(S): 1 INJECTION, POWDER, FOR SOLUTION INTRAMUSCULAR; INTRAVENOUS at 18:44

## 2017-05-11 RX ADMIN — Medication 250 MILLIGRAM(S): at 08:29

## 2017-05-11 RX ADMIN — LISINOPRIL 10 MILLIGRAM(S): 2.5 TABLET ORAL at 05:12

## 2017-05-11 RX ADMIN — Medication 3 MILLILITER(S): at 22:05

## 2017-05-11 RX ADMIN — Medication 3 MILLILITER(S): at 15:38

## 2017-05-11 RX ADMIN — Medication 100 MILLIGRAM(S): at 05:13

## 2017-05-11 NOTE — PROGRESS NOTE ADULT - PROBLEM SELECTOR PLAN 1
Continue Maxipime and Flagyl (started 05/06/17) for aspiration coverage,   needs iv antibiotics till Sunday as pe ID Dr Britton  No more fevers, leukocytosis resolved   DC plan Sunday if patient remain a febrile

## 2017-05-11 NOTE — PROGRESS NOTE ADULT - SUBJECTIVE AND OBJECTIVE BOX
86y male is currently under our care for bilateral pneumonia likely from aspiration plus h/o ?endocarditis.  Patient continues to do well, but is not answering any questions today. Patient remains afebrile.    Meds:  vancomycin  IVPB 1000milliGRAM(s) IV Intermittent every 24 hours  cefepime  IVPB 500milliGRAM(s) IV Intermittent every 12 hours  metroNIDAZOLE  IVPB 500milliGRAM(s) IV Intermittent every 8 hours    Allergies: No Known Allergies    ROS:  [ X ] UNABLE TO ELICIT  General:  [ N ] Fever      PHYSICAL EXAM:    Vital Signs Last 24 Hrs  T(C): 36.7, Max: 36.9 (05-10 @ 20:32)  T(F): 98, Max: 98.4 (05-10 @ 20:32)  HR: 93 (91 - 98)  BP: 164/86 (146/81 - 182/89)  BP(mean): --  RR: 17 (17 - 18)  SpO2: 99% (97% - 100%)    HEENT: +NC @ 2L/min  Neck:  [ N ] Supple - arthritic neck   CHEST/Respiratory:  [ Y ] Rales of left base     [ N ] Rhonchi      [ N ] Wheezing   - decreased bilateral BS  Cardiovascular:  [ + ] S1 S2  [ + ] Irreg   Gastrointestinal:  Bowel Sounds: [ Y ] ABD Soft  [ N ] ABD Distention  [ N ] Tenderness    - Barr with clear yellow urine  Extremities: [ N ] Edema   -right arm PICC line   -noted bruising of left forearm  Neurological:  [ Y ] Alert  [ Y ] Awake  [ N ] Oriented    Skin:   [ N ] Rashes      LABS/DIAGNOSTIC TESTS    No new labs today    Vancomycin Level, Trough (05.10.17 @ 06:15)  Vancomycin Level, Trough: 19.9

## 2017-05-11 NOTE — PROGRESS NOTE ADULT - ATTENDING COMMENTS
Patient was seen and examined by myself with team. Case was discussed with house staff in details.  Continue with IV antibiotics for aspiration PNA and recently diagnosed infective endocarditis.  All antibiotics till 5/14 2017. Then remove PICC line   Anticipate discharge home on Monday May 15th with resumption of HHA. services.

## 2017-05-11 NOTE — PROGRESS NOTE ADULT - PROBLEM SELECTOR PLAN 3
Continue home dose Lisinopril   Bp slightly elevated, could be secondary to iv fluids  Will monitor for now and continue same dose lisinopril

## 2017-05-11 NOTE — PROGRESS NOTE ADULT - SUBJECTIVE AND OBJECTIVE BOX
Patient is a 86y old  Male who presents with a chief complaint of Dyspnea , fever and cough  being treated for aspiration pneumonia(09 May 2017 16:17)      INTERVAL HPI/OVERNIGHT EVENTS:  Patient seen and examined at bedside. Patient is minimally interactive and is confused. On question, he answered he is in [place] (same response as before) Denies chest pain, palpitation, SOB, nausea, vomiting, abdominal pain.    T(C): 36.7, Max: 36.9 (05-10 @ 20:32)  HR: 93 (91 - 98)  BP: 164/86 (146/81 - 182/89)  RR: 17 (17 - 18)  SpO2: 99% (97% - 100%)    I & Os for current day (as of 11 May 2017 11:55)  =============================================  IN: 0 ml / OUT: 801 ml / NET: -801 ml        REVIEW OF SYSTEMS:  No fever,   No cough, SOB  No chest pain, palpitations  No Abd pain, nausea, vomiting, No diarrhea or constipation      PHYSICAL EXAM:    A X O x 0, can speak but is confused  NECK: Supple, No JVD, Normal thyroid  Resp: B/l crackles, no wheezing,   CVS: Irregular rate and rhythm; No murmurs, rubs, or gallops  ABD: Soft, Nontender, Nondistended; Bowel sounds present  EXTREMITIES:  2+ Peripheral Pulses, No edema    LABS:                        9.9    8.5   )-----------( 222      ( 10 May 2017 06:15 )             31.1     05-10    144  |  109<H>  |  22<H>  ----------------------------<  104<H>  3.6   |  31  |  1.09    Ca    8.2<L>      10 May 2017 06:15  Phos  2.0     05-10  Mg     1.7     05-10          CAPILLARY BLOOD GLUCOSE  130 (11 May 2017 07:51)  156 (10 May 2017 20:32)  115 (10 May 2017 15:47)            Radiology: Patient is a 86y old  Male who presents with a chief complaint of Dyspnea , fever and cough  being treated for aspiration pneumonia(09 May 2017 16:17)      INTERVAL HPI/OVERNIGHT EVENTS:  Patient seen and examined at bedside. Patient is minimally interactive and is confused. On question, he answered he is in [place] (same response as before) Denies chest pain, palpitation, SOB, nausea, vomiting, abdominal pain.  Spoke to Dr Walsh, Pager  (Ph ) and updated about presumptive plan of continuing Vanco till 5/13 and removing PICC line on sunday    T(C): 36.7, Max: 36.9 (05-10 @ 20:32)  HR: 93 (91 - 98)  BP: 164/86 (146/81 - 182/89)  RR: 17 (17 - 18)  SpO2: 99% (97% - 100%)    I & Os for current day (as of 11 May 2017 11:55)  =============================================  IN: 0 ml / OUT: 801 ml / NET: -801 ml        REVIEW OF SYSTEMS:  No fever,   No cough, SOB  No chest pain, palpitations  No Abd pain, nausea, vomiting, No diarrhea or constipation      PHYSICAL EXAM:    A X O x 0, can speak but is confused  NECK: Supple, No JVD, Normal thyroid  Resp: B/l crackles, no wheezing,   CVS: Irregular rate and rhythm; No murmurs, rubs, or gallops  ABD: Soft, Nontender, Nondistended; Bowel sounds present  EXTREMITIES:  2+ Peripheral Pulses, No edema    LABS:                        9.9    8.5   )-----------( 222      ( 10 May 2017 06:15 )             31.1     05-10    144  |  109<H>  |  22<H>  ----------------------------<  104<H>  3.6   |  31  |  1.09    Ca    8.2<L>      10 May 2017 06:15  Phos  2.0     05-10  Mg     1.7     05-10          CAPILLARY BLOOD GLUCOSE  130 (11 May 2017 07:51)  156 (10 May 2017 20:32)  115 (10 May 2017 15:47)            Radiology:

## 2017-05-12 LAB
ANION GAP SERPL CALC-SCNC: 5 MMOL/L — SIGNIFICANT CHANGE UP (ref 5–17)
BUN SERPL-MCNC: 16 MG/DL — SIGNIFICANT CHANGE UP (ref 7–18)
CALCIUM SERPL-MCNC: 8.5 MG/DL — SIGNIFICANT CHANGE UP (ref 8.4–10.5)
CHLORIDE SERPL-SCNC: 108 MMOL/L — SIGNIFICANT CHANGE UP (ref 96–108)
CO2 SERPL-SCNC: 35 MMOL/L — HIGH (ref 22–31)
CREAT SERPL-MCNC: 1.16 MG/DL — SIGNIFICANT CHANGE UP (ref 0.5–1.3)
GLUCOSE SERPL-MCNC: 109 MG/DL — HIGH (ref 70–99)
HCT VFR BLD CALC: 33.6 % — LOW (ref 39–50)
HGB BLD-MCNC: 10.5 G/DL — LOW (ref 13–17)
MAGNESIUM SERPL-MCNC: 2 MG/DL — SIGNIFICANT CHANGE UP (ref 1.6–2.6)
MCHC RBC-ENTMCNC: 28.4 PG — SIGNIFICANT CHANGE UP (ref 27–34)
MCHC RBC-ENTMCNC: 31.2 GM/DL — LOW (ref 32–36)
MCV RBC AUTO: 91 FL — SIGNIFICANT CHANGE UP (ref 80–100)
PHOSPHATE SERPL-MCNC: 2.7 MG/DL — SIGNIFICANT CHANGE UP (ref 2.5–4.5)
PLATELET # BLD AUTO: 237 K/UL — SIGNIFICANT CHANGE UP (ref 150–400)
POTASSIUM SERPL-MCNC: 4.2 MMOL/L — SIGNIFICANT CHANGE UP (ref 3.5–5.3)
POTASSIUM SERPL-SCNC: 4.2 MMOL/L — SIGNIFICANT CHANGE UP (ref 3.5–5.3)
RBC # BLD: 3.69 M/UL — LOW (ref 4.2–5.8)
RBC # FLD: 14.7 % — HIGH (ref 10.3–14.5)
SODIUM SERPL-SCNC: 148 MMOL/L — HIGH (ref 135–145)
WBC # BLD: 7.6 K/UL — SIGNIFICANT CHANGE UP (ref 3.8–10.5)
WBC # FLD AUTO: 7.6 K/UL — SIGNIFICANT CHANGE UP (ref 3.8–10.5)

## 2017-05-12 PROCEDURE — 99232 SBSQ HOSP IP/OBS MODERATE 35: CPT | Mod: GC

## 2017-05-12 RX ORDER — SODIUM CHLORIDE 9 MG/ML
1000 INJECTION, SOLUTION INTRAVENOUS
Qty: 0 | Refills: 0 | Status: DISCONTINUED | OUTPATIENT
Start: 2017-05-12 | End: 2017-05-15

## 2017-05-12 RX ORDER — SODIUM CHLORIDE 9 MG/ML
1000 INJECTION, SOLUTION INTRAVENOUS
Qty: 0 | Refills: 0 | Status: DISCONTINUED | OUTPATIENT
Start: 2017-05-12 | End: 2017-05-12

## 2017-05-12 RX ADMIN — Medication 3 MILLILITER(S): at 20:52

## 2017-05-12 RX ADMIN — HEPARIN SODIUM 5000 UNIT(S): 5000 INJECTION INTRAVENOUS; SUBCUTANEOUS at 22:39

## 2017-05-12 RX ADMIN — SODIUM CHLORIDE 60 MILLILITER(S): 9 INJECTION, SOLUTION INTRAVENOUS at 11:04

## 2017-05-12 RX ADMIN — Medication 100 MILLIGRAM(S): at 13:12

## 2017-05-12 RX ADMIN — Medication 3 MILLILITER(S): at 16:42

## 2017-05-12 RX ADMIN — LISINOPRIL 10 MILLIGRAM(S): 2.5 TABLET ORAL at 06:30

## 2017-05-12 RX ADMIN — MEMANTINE HYDROCHLORIDE 10 MILLIGRAM(S): 10 TABLET ORAL at 06:29

## 2017-05-12 RX ADMIN — Medication 3 MILLILITER(S): at 09:32

## 2017-05-12 RX ADMIN — CEFEPIME 100 MILLIGRAM(S): 1 INJECTION, POWDER, FOR SOLUTION INTRAMUSCULAR; INTRAVENOUS at 06:27

## 2017-05-12 RX ADMIN — Medication 100 MILLIGRAM(S): at 07:46

## 2017-05-12 RX ADMIN — HEPARIN SODIUM 5000 UNIT(S): 5000 INJECTION INTRAVENOUS; SUBCUTANEOUS at 13:11

## 2017-05-12 RX ADMIN — CEFEPIME 100 MILLIGRAM(S): 1 INJECTION, POWDER, FOR SOLUTION INTRAMUSCULAR; INTRAVENOUS at 18:41

## 2017-05-12 RX ADMIN — Medication 100 MILLIGRAM(S): at 22:39

## 2017-05-12 RX ADMIN — SODIUM CHLORIDE 60 MILLILITER(S): 9 INJECTION, SOLUTION INTRAVENOUS at 08:43

## 2017-05-12 RX ADMIN — Medication 100 MILLIGRAM(S): at 18:06

## 2017-05-12 RX ADMIN — HEPARIN SODIUM 5000 UNIT(S): 5000 INJECTION INTRAVENOUS; SUBCUTANEOUS at 06:29

## 2017-05-12 RX ADMIN — Medication 100 MILLIGRAM(S): at 06:29

## 2017-05-12 RX ADMIN — Medication 250 MILLIGRAM(S): at 08:25

## 2017-05-12 RX ADMIN — MEMANTINE HYDROCHLORIDE 10 MILLIGRAM(S): 10 TABLET ORAL at 18:06

## 2017-05-12 RX ADMIN — LATANOPROST 1 DROP(S): 0.05 SOLUTION/ DROPS OPHTHALMIC; TOPICAL at 22:40

## 2017-05-12 NOTE — DIETITIAN INITIAL EVALUATION ADULT. - PROBLEM SELECTOR PLAN 1
Secondary to uti versus secondary to aspiration pna  Patient is already being emperically treated with vancomycin for endocarditis. Will add zosyn for anaerobic coverage  Will hold vancomycin as trough is 22 . F/u repeat vancomycin trough in am and restart  Labs are significant for elevated white count of 11.8 with a lactate 4.1  Qsofa score of 1 for increasing lethargy . Appropriate to be managed on floor at this time  Will repeat lactate after patient gets 2 litres of fluid  F/u procalcitonin and repeat lactate  UA is positive . Patient has a chronic carlson changed last week. Will change now  follow blood & urine cultures  rvp negative  Patient also has a PICC that was placed for long term antibiotics 4/23 in [facility], which will need to be changed if patient continues spike fevers

## 2017-05-12 NOTE — PROGRESS NOTE ADULT - ASSESSMENT
Patient is a 86 year-old male, history of dementia , bed bound and non verbal, paroxysmal atrial fibrillation not on anticoagulation , HTN, glaucoma, BPH was brought in by son as he was acutely short of breath, tachypneic and wheezing when son who is a physician examined him this morning . Son also reports that patient felt warm and has had productive cough and has been lethargic since he was discharged from the hospital a week ago.  Admitted for aspiration pneumonia.

## 2017-05-12 NOTE — DIETITIAN INITIAL EVALUATION ADULT. - NUTRITION INTERVENTION
Medical Food Supplements/Collaboration and Referral of Nutrition Care/Vitamin/Feeding Assistance/Meals and Snack

## 2017-05-12 NOTE — PROGRESS NOTE ADULT - ATTENDING COMMENTS
Attending Covering Dr. Taylor:    Patient seen and examined earlier today around 2.30PM; No acute distress; Able to respond with simple answers; denies any pain.   Agree with PGY 1 A/P above.    Patient is a 86 year-old male, history of dementia , bed bound and non verbal, paroxysmal atrial fibrillation not on anticoagulation , HTN, glaucoma, BPH was brought in by son as he was acutely short of breath, tachypneic and wheezing found to have Aspiration Pneumonia.     Vitals reviewed; stable    P/E:  As per PGY 1  lethargic but arousable;   CVS: S1S2 present;   Resp: BLAE+, No wheezing at +nt  GI: Soft, BS+, No tender  extr: No edema or calf tenderness b/l LE    Labs: Stable    Plan:  Aspiration PNA: Continue IV Antibiotic until Sunday after which discontinue PICC line;  Endocarditis: Completing Antibiotics Sunday.  discussed with  who spoke with Son Dr. Sotelo who will reinstate 24 hr Pvt HHA for Monday discahrge.  HTN: Continue Lisinopril  Dementia: Continue Mementine; Aspiration and fall precautions  BPH; Continue Barr drainage  PAF: Rate control; No anticoagulation

## 2017-05-12 NOTE — PROGRESS NOTE ADULT - SUBJECTIVE AND OBJECTIVE BOX
Patient is a 86y old  Male who presents with a chief complaint of Dyspnea , fever and cough (09 May 2017 16:17)      INTERVAL HPI/OVERNIGHT EVENTS:  Patient seen and examined at bedside. Patient is AxO X 0 an is minimally interactive. Unable to answer question about chest pain, palpitation SOB, abdominal pain. As per nurse patient tolerating diet and no abdominal discomfort or vomiting noticed.     T(C): 37.2, Max: 37.2 (05-12 @ 05:19)  HR: 92 (92 - 97)  BP: 168/85 (145/77 - 168/85)  RR: 85 (16 - 85)  SpO2: 97% (97% - 100%)  Wt(kg): --  I&O's Summary    I & Os for current day (as of 12 May 2017 10:21)  =============================================  IN: 0 ml / OUT: 500 ml / NET: -500 ml        REVIEW OF SYSTEMS:  Patient not interactive  No fever,   No cough,  No Abd pain, nausea, vomiting, No diarrhea or constipation      PHYSICAL EXAM:    A X O x 0  NECK: Supple, No JVD, Normal thyroid  Resp: b/l minimal crackles, no wheezing,   CVS: Regular rate and rhythm; No murmurs, rubs, or gallops  ABD: Soft, Nontender, Nondistended; Bowel sounds present  EXTREMITIES:  2+ Peripheral Pulses, No edema    LABS:                        10.5   7.6   )-----------( 237      ( 12 May 2017 05:57 )             33.6     05-12    148<H>  |  108  |  16  ----------------------------<  109<H>  4.2   |  35<H>  |  1.16    Ca    8.5      12 May 2017 05:57  Phos  2.7     05-12  Mg     2.0     05-12          CAPILLARY BLOOD GLUCOSE  116 (12 May 2017 08:03)  119 (11 May 2017 21:32)  132 (11 May 2017 15:30)  164 (11 May 2017 12:23)            Radiology:

## 2017-05-12 NOTE — DIETITIAN INITIAL EVALUATION ADULT. - MD RECOMMEND
po supplement/Add Ensure Pudding 120ml x tid (510kcal 12g protein) to Dysphagia 1 Pureed - Honey consistent fluids, as medically feasible

## 2017-05-12 NOTE — PROGRESS NOTE ADULT - PROBLEM SELECTOR PLAN 1
Continue Maxipime and Flagyl (started 05/06/17) for aspiration coverage,   Continue antibiotics till Sunday as pe ID Dr Britton  Patient has private HHA, will inform son  Ashleigh about dc plan so that he can try to reinstate aid on Sunday.   DC plan on Sunday if patient remain a febrile  Talked to dr Walsh yesterday, ID doctor in [place], informed about discontinuing Vancomycin and removing PICC line on Sunday and she agrees with plan.

## 2017-05-12 NOTE — DIETITIAN INITIAL EVALUATION ADULT. - PROBLEM SELECTOR PLAN 6
To continue home dose Lisinopril as per discussion with attending . To discontinue if CHARLIE continues to worsen

## 2017-05-12 NOTE — DIETITIAN INITIAL EVALUATION ADULT. - OTHER INFO
Patient seen for LOS x6days. Patient from home & bedbound. Visited pt, contracted & confused, poor historian, tried contacting son no reply presently, per PCA pt. eats well when fed 40-50% & tolerating, no reports of GI distress, pressure ulcer (sacral-stage I) noted.

## 2017-05-12 NOTE — DIETITIAN INITIAL EVALUATION ADULT. - FACTORS AFF FOOD INTAKE
difficulty chewing/difficulty swallowing/difficulty with food procurement/preparation/other (specify)/Bedbound, lethargic/change in mental status/difficulty feeding self

## 2017-05-12 NOTE — DIETITIAN INITIAL EVALUATION ADULT. - NS AS NUTRI INTERV COLLABORAT
Speech & Swallow evaluation, as medically feasible/Collaboration with other nutrition professionals/Collaboration with other providers

## 2017-05-13 LAB — VANCOMYCIN TROUGH SERPL-MCNC: 22.1 UG/ML — HIGH (ref 10–20)

## 2017-05-13 PROCEDURE — 99232 SBSQ HOSP IP/OBS MODERATE 35: CPT

## 2017-05-13 RX ADMIN — LATANOPROST 1 DROP(S): 0.05 SOLUTION/ DROPS OPHTHALMIC; TOPICAL at 22:09

## 2017-05-13 RX ADMIN — LISINOPRIL 10 MILLIGRAM(S): 2.5 TABLET ORAL at 05:00

## 2017-05-13 RX ADMIN — SODIUM CHLORIDE 60 MILLILITER(S): 9 INJECTION, SOLUTION INTRAVENOUS at 05:06

## 2017-05-13 RX ADMIN — Medication 100 MILLIGRAM(S): at 05:00

## 2017-05-13 RX ADMIN — Medication 100 MILLIGRAM(S): at 22:09

## 2017-05-13 RX ADMIN — MEMANTINE HYDROCHLORIDE 10 MILLIGRAM(S): 10 TABLET ORAL at 05:00

## 2017-05-13 RX ADMIN — MEMANTINE HYDROCHLORIDE 10 MILLIGRAM(S): 10 TABLET ORAL at 17:54

## 2017-05-13 RX ADMIN — SENNA PLUS 2 TABLET(S): 8.6 TABLET ORAL at 22:10

## 2017-05-13 RX ADMIN — HEPARIN SODIUM 5000 UNIT(S): 5000 INJECTION INTRAVENOUS; SUBCUTANEOUS at 22:10

## 2017-05-13 RX ADMIN — Medication 3 MILLILITER(S): at 02:35

## 2017-05-13 RX ADMIN — CEFEPIME 100 MILLIGRAM(S): 1 INJECTION, POWDER, FOR SOLUTION INTRAMUSCULAR; INTRAVENOUS at 18:30

## 2017-05-13 RX ADMIN — Medication 3 MILLILITER(S): at 08:31

## 2017-05-13 RX ADMIN — Medication 3 MILLILITER(S): at 20:51

## 2017-05-13 RX ADMIN — Medication 100 MILLIGRAM(S): at 14:09

## 2017-05-13 RX ADMIN — HEPARIN SODIUM 5000 UNIT(S): 5000 INJECTION INTRAVENOUS; SUBCUTANEOUS at 05:00

## 2017-05-13 RX ADMIN — CEFEPIME 100 MILLIGRAM(S): 1 INJECTION, POWDER, FOR SOLUTION INTRAMUSCULAR; INTRAVENOUS at 05:00

## 2017-05-13 RX ADMIN — HEPARIN SODIUM 5000 UNIT(S): 5000 INJECTION INTRAVENOUS; SUBCUTANEOUS at 14:09

## 2017-05-13 RX ADMIN — Medication 100 MILLIGRAM(S): at 05:01

## 2017-05-13 RX ADMIN — Medication 3 MILLILITER(S): at 15:28

## 2017-05-13 NOTE — PROGRESS NOTE ADULT - SUBJECTIVE AND OBJECTIVE BOX
Attending Covering Dr. Taylor    Patient is a 86y old  Male who presents with a chief complaint of Dyspnea , fever and cough (09 May 2017 16:17)      INTERVAL HPI/OVERNIGHT EVENTS: None; Patient able to speak few words and follow simple commands today    MEDICATIONS  (STANDING):  memantine 10milliGRAM(s) Oral two times a day  latanoprost 0.005% Ophthalmic Solution 1Drop(s) Both EYES at bedtime  ALBUTerol/ipratropium for Nebulization 3milliLiter(s) Nebulizer every 6 hours  heparin  Injectable 5000Unit(s) SubCutaneous every 8 hours  vancomycin  IVPB 1000milliGRAM(s) IV Intermittent every 24 hours  cefepime  IVPB 500milliGRAM(s) IV Intermittent every 12 hours  cefepime  IVPB  IV Intermittent   metroNIDAZOLE  IVPB  IV Intermittent   metroNIDAZOLE  IVPB 500milliGRAM(s) IV Intermittent every 8 hours  lisinopril 10milliGRAM(s) Oral daily  senna 2Tablet(s) Oral at bedtime  docusate sodium 100milliGRAM(s) Oral two times a day  dextrose 5%. 1000milliLiter(s) IV Continuous <Continuous>    MEDICATIONS  (PRN):      REVIEW OF SYSTEMS:  CONSTITUTIONAL: No fever, weight loss, or fatigue  RESPIRATORY: No cough, wheezing, chills or hemoptysis; No shortness of breath  CARDIOVASCULAR: No chest pain, palpitations, dizziness, or leg swelling  GASTROINTESTINAL: No abdominal or epigastric pain. No nausea, vomiting, or hematemesis; No diarrhea or constipation. No melena or hematochezia.  NEUROLOGICAL: No headaches, memory loss, loss of strength, numbness, or tremors  MUSCULOSKELETAL: No joint pain or swelling; No muscle, back, or extremity pain      Vital Signs Last 24 Hrs  T(C): 37, Max: 37.1 (05-13 @ 05:15)  T(F): 98.6, Max: 98.7 (05-13 @ 05:15)  HR: 93 (83 - 93)  BP: 147/93 (146/86 - 174/94)  BP(mean): --  RR: 15 (15 - 18)  SpO2: 98% (97% - 100%)    PHYSICAL EXAM:  GENERAL: NAD, well-groomed, well-developed  HEAD:  Atraumatic, Normocephalic  EYES: EOMI, PERRLA, conjunctiva and sclera clear  NECK: Supple, No JVD, Normal thyroid  NERVOUS SYSTEM:  Alert & Oriented X3, No gross focal deficits  CHEST/LUNG: Clear to percussion bilaterally; No rales, rhonchi, wheezing, or rubs  HEART: Regular rate and rhythm; No murmurs, rubs, or gallops  ABDOMEN: Soft, Nontender, Nondistended; Bowel sounds present  EXTREMITIES:  No clubbing, cyanosis, or edema  SKIN: No rashes or lesions    LABS:                        10.5   7.6   )-----------( 237      ( 12 May 2017 05:57 )             33.6     05-12    148<H>  |  108  |  16  ----------------------------<  109<H>  4.2   |  35<H>  |  1.16    Ca    8.5      12 May 2017 05:57  Phos  2.7     05-12  Mg     2.0     05-12          CAPILLARY BLOOD GLUCOSE  106 (13 May 2017 15:52)  110 (13 May 2017 12:11)  108 (13 May 2017 08:20)  121 (12 May 2017 21:12)

## 2017-05-13 NOTE — PROGRESS NOTE ADULT - PROBLEM SELECTOR PLAN 1
Continue Maxipime and Flagyl (started 05/06/17) for aspiration coverage,   Continue antibiotics till Sunday as pe ID Dr Britton; Then remove PICC line prior to discharge  Patient has private HHA, which will be reinstated for Monday discharge as d/w  on Friday

## 2017-05-13 NOTE — PROGRESS NOTE ADULT - NSHPATTENDINGPLANDISCUSS_GEN_ALL_CORE
PGY1 Dr. Calvo;  Gabriela
PGY1 Dr. Calvo;  Gabriela
PGY 1; nursing staff
PGY 1; d/w son; d/w nursing staff

## 2017-05-14 LAB
ANION GAP SERPL CALC-SCNC: 6 MMOL/L — SIGNIFICANT CHANGE UP (ref 5–17)
BUN SERPL-MCNC: 12 MG/DL — SIGNIFICANT CHANGE UP (ref 7–18)
CALCIUM SERPL-MCNC: 8.1 MG/DL — LOW (ref 8.4–10.5)
CHLORIDE SERPL-SCNC: 104 MMOL/L — SIGNIFICANT CHANGE UP (ref 96–108)
CO2 SERPL-SCNC: 33 MMOL/L — HIGH (ref 22–31)
CREAT SERPL-MCNC: 1.17 MG/DL — SIGNIFICANT CHANGE UP (ref 0.5–1.3)
GLUCOSE SERPL-MCNC: 111 MG/DL — HIGH (ref 70–99)
HCT VFR BLD CALC: 31.8 % — LOW (ref 39–50)
HGB BLD-MCNC: 10.2 G/DL — LOW (ref 13–17)
MAGNESIUM SERPL-MCNC: 1.9 MG/DL — SIGNIFICANT CHANGE UP (ref 1.6–2.6)
MCHC RBC-ENTMCNC: 29.2 PG — SIGNIFICANT CHANGE UP (ref 27–34)
MCHC RBC-ENTMCNC: 32.2 GM/DL — SIGNIFICANT CHANGE UP (ref 32–36)
MCV RBC AUTO: 90.8 FL — SIGNIFICANT CHANGE UP (ref 80–100)
PHOSPHATE SERPL-MCNC: 2.3 MG/DL — LOW (ref 2.5–4.5)
PLATELET # BLD AUTO: 238 K/UL — SIGNIFICANT CHANGE UP (ref 150–400)
POTASSIUM SERPL-MCNC: 3.4 MMOL/L — LOW (ref 3.5–5.3)
POTASSIUM SERPL-SCNC: 3.4 MMOL/L — LOW (ref 3.5–5.3)
RBC # BLD: 3.5 M/UL — LOW (ref 4.2–5.8)
RBC # FLD: 14.6 % — HIGH (ref 10.3–14.5)
SODIUM SERPL-SCNC: 143 MMOL/L — SIGNIFICANT CHANGE UP (ref 135–145)
WBC # BLD: 8.7 K/UL — SIGNIFICANT CHANGE UP (ref 3.8–10.5)
WBC # FLD AUTO: 8.7 K/UL — SIGNIFICANT CHANGE UP (ref 3.8–10.5)

## 2017-05-14 PROCEDURE — 99232 SBSQ HOSP IP/OBS MODERATE 35: CPT | Mod: GC

## 2017-05-14 RX ORDER — SODIUM,POTASSIUM PHOSPHATES 278-250MG
1 POWDER IN PACKET (EA) ORAL
Qty: 0 | Refills: 0 | Status: DISCONTINUED | OUTPATIENT
Start: 2017-05-14 | End: 2017-05-15

## 2017-05-14 RX ORDER — POTASSIUM CHLORIDE 20 MEQ
40 PACKET (EA) ORAL EVERY 4 HOURS
Qty: 0 | Refills: 0 | Status: COMPLETED | OUTPATIENT
Start: 2017-05-14 | End: 2017-05-14

## 2017-05-14 RX ADMIN — SODIUM CHLORIDE 60 MILLILITER(S): 9 INJECTION, SOLUTION INTRAVENOUS at 06:21

## 2017-05-14 RX ADMIN — Medication 100 MILLIGRAM(S): at 06:10

## 2017-05-14 RX ADMIN — Medication 100 MILLIGRAM(S): at 13:17

## 2017-05-14 RX ADMIN — Medication 1 TABLET(S): at 18:23

## 2017-05-14 RX ADMIN — SODIUM CHLORIDE 60 MILLILITER(S): 9 INJECTION, SOLUTION INTRAVENOUS at 18:24

## 2017-05-14 RX ADMIN — Medication 3 MILLILITER(S): at 15:31

## 2017-05-14 RX ADMIN — LISINOPRIL 10 MILLIGRAM(S): 2.5 TABLET ORAL at 06:09

## 2017-05-14 RX ADMIN — LATANOPROST 1 DROP(S): 0.05 SOLUTION/ DROPS OPHTHALMIC; TOPICAL at 22:46

## 2017-05-14 RX ADMIN — HEPARIN SODIUM 5000 UNIT(S): 5000 INJECTION INTRAVENOUS; SUBCUTANEOUS at 06:09

## 2017-05-14 RX ADMIN — Medication 1 TABLET(S): at 22:46

## 2017-05-14 RX ADMIN — Medication 3 MILLILITER(S): at 20:38

## 2017-05-14 RX ADMIN — Medication 3 MILLILITER(S): at 10:21

## 2017-05-14 RX ADMIN — Medication 40 MILLIEQUIVALENT(S): at 12:33

## 2017-05-14 RX ADMIN — HEPARIN SODIUM 5000 UNIT(S): 5000 INJECTION INTRAVENOUS; SUBCUTANEOUS at 22:46

## 2017-05-14 RX ADMIN — HEPARIN SODIUM 5000 UNIT(S): 5000 INJECTION INTRAVENOUS; SUBCUTANEOUS at 13:17

## 2017-05-14 RX ADMIN — Medication 250 MILLIGRAM(S): at 07:15

## 2017-05-14 RX ADMIN — CEFEPIME 100 MILLIGRAM(S): 1 INJECTION, POWDER, FOR SOLUTION INTRAMUSCULAR; INTRAVENOUS at 06:08

## 2017-05-14 RX ADMIN — Medication 1 TABLET(S): at 12:33

## 2017-05-14 RX ADMIN — Medication 40 MILLIEQUIVALENT(S): at 13:18

## 2017-05-14 RX ADMIN — Medication 100 MILLIGRAM(S): at 18:24

## 2017-05-14 RX ADMIN — CEFEPIME 100 MILLIGRAM(S): 1 INJECTION, POWDER, FOR SOLUTION INTRAMUSCULAR; INTRAVENOUS at 18:23

## 2017-05-14 RX ADMIN — MEMANTINE HYDROCHLORIDE 10 MILLIGRAM(S): 10 TABLET ORAL at 06:09

## 2017-05-14 RX ADMIN — Medication 3 MILLILITER(S): at 02:41

## 2017-05-14 RX ADMIN — MEMANTINE HYDROCHLORIDE 10 MILLIGRAM(S): 10 TABLET ORAL at 18:24

## 2017-05-14 RX ADMIN — Medication 100 MILLIGRAM(S): at 06:09

## 2017-05-14 RX ADMIN — SENNA PLUS 2 TABLET(S): 8.6 TABLET ORAL at 22:46

## 2017-05-14 NOTE — PROGRESS NOTE ADULT - PROBLEM SELECTOR PLAN 4
Patient at mental baseline per son at bedside   Continue memantine, aspiration precautions  fall precautions.

## 2017-05-14 NOTE — PROGRESS NOTE ADULT - PROBLEM SELECTOR PLAN 2
No anticoagulation  TTE from previous admission last week showing: EF 70% and thickened mitral valve that is incompletely visualized  Continue vanco till 5/13 for treatment for suspected endocarditis
No anticoagulation  TTE from previous admission last week showing: EF 70% and thickened mitral valve that is incompletely visualized  Continue vanco till 5/13 for treatment for suspected endocarditis as patient son refused LAURA in [place].
Not a candidate for anticoagulation  TTE from previous admission last week showing: EF 70% and thickened mitral valve that is incompletely visualized
Not a candidate for anticoagulation;  anticoagulation
No anticoagulation  TTE from previous admission last week showing: EF 70% and thickened mitral valve that is incompletely visualized  Continue vanco till 5/13 for treatment for suspected endocarditis

## 2017-05-14 NOTE — PROGRESS NOTE ADULT - PROBLEM SELECTOR PLAN 1
Discontinue Maxipime and Flagyl today (started 05/06/17) for aspiration coverage,   ID Dr Britton  Patient has private HHA,  and son  Ashleigh aware about re instating 24 hr HHA on Monday and about dc plan.  Dr Walsh, ID doctor in [place] who treated the patient for suspected infective endocarditis aware of plan. Discontinue Maxipime and Flagyl today (started 05/06/17) for aspiration coverage,   Patient has private HHA,  and son  Ashleigh aware about reinstating 24 hr HHA on Monday and about dc plan.  Dr Walsh, ID doctor in [place] who treated the patient for suspected infective endocarditis aware of plan.

## 2017-05-14 NOTE — PROGRESS NOTE ADULT - ATTENDING COMMENTS
patient seen and examined earlier today; Clinically improved; Agree with PGY1 A/P above; Discussed with PGY1 Dr. Calvo    remove PICC line and discharge home tomorrow with HHA reinstated

## 2017-05-14 NOTE — PROGRESS NOTE ADULT - PROBLEM SELECTOR PLAN 7
Continue with chronic carlson.

## 2017-05-14 NOTE — PROGRESS NOTE ADULT - PROVIDER SPECIALTY LIST ADULT
Infectious Disease
Infectious Disease
Internal Medicine

## 2017-05-14 NOTE — PROGRESS NOTE ADULT - PROBLEM SELECTOR PLAN 6
Heparin Sub Q for DVT prophylaxis. Improve score >2(immobile and age>60)
Heparin Sub Q for DVT prophylaxis

## 2017-05-14 NOTE — PROGRESS NOTE ADULT - PROBLEM SELECTOR PROBLEM 5
CHARLIE (acute kidney injury)

## 2017-05-14 NOTE — PROGRESS NOTE ADULT - PROBLEM SELECTOR PLAN 5
Resolved with iv fluids

## 2017-05-14 NOTE — PROGRESS NOTE ADULT - SUBJECTIVE AND OBJECTIVE BOX
Patient is a 86y old  Male who presents with a chief complaint of Dyspnea , fever and cough (09 May 2017 16:17)      INTERVAL HPI/OVERNIGHT EVENTS:  Patient seen and examined at bedside. Patient is minimally interactive, denies chest pain, palpitations, SOB, nausea, vomiting, abdominal pain.    T(C): 36.9, Max: 37 (05-13 @ 15:52)  HR: 95 (93 - 98)  BP: 144/72 (140/69 - 147/93)  RR: 17 (15 - 17)  SpO2: 99% (98% - 99%)    I & Os for current day (as of 14 May 2017 10:32)  =============================================  IN: 580 ml / OUT: 1300 ml / NET: -720 ml        REVIEW OF SYSTEMS:  No fever,   No cough, SOB  No chest pain, palpitations  No Abd pain, nausea, vomiting, No diarrhea or constipation      PHYSICAL EXAM:    A X O x 0  NECK: Supple, No JVD, Normal thyroid  Resp: CTAB, No crackles, wheezing,   CVS: Regular rate and rhythm; No murmurs, rubs, or gallops  ABD: Soft, Nontender, Nondistended; Bowel sounds present  EXTREMITIES:  2+ Peripheral Pulses, No edema    LABS:                        10.2   8.7   )-----------( 238      ( 14 May 2017 06:29 )             31.8     05-14    143  |  104  |  12  ----------------------------<  111<H>  3.4<L>   |  33<H>  |  1.17    Ca    8.1<L>      14 May 2017 06:29  Phos  2.3     05-14  Mg     1.9     05-14          CAPILLARY BLOOD GLUCOSE  119 (14 May 2017 08:18)  159 (13 May 2017 21:29)  106 (13 May 2017 15:52)  110 (13 May 2017 12:11)

## 2017-05-14 NOTE — PROGRESS NOTE ADULT - PROBLEM SELECTOR PLAN 3
Continue home dose Lisinopril   Bp stable  Will monitor for now and continue same dose lisinopril Continue home dose Lisinopril; BP stable  Will monitor for now and continue same dose lisinopril

## 2017-05-14 NOTE — PROGRESS NOTE ADULT - PROBLEM SELECTOR PROBLEM 1
Aspiration pneumonia

## 2017-05-14 NOTE — PROGRESS NOTE ADULT - PROBLEM SELECTOR PROBLEM 7
BPH (benign prostatic hypertrophy) with urinary retention

## 2017-05-15 ENCOUNTER — APPOINTMENT (OUTPATIENT)
Dept: HOME HEALTH SERVICES | Facility: HOME HEALTH | Age: 82
End: 2017-05-15

## 2017-05-15 VITALS
RESPIRATION RATE: 16 BRPM | OXYGEN SATURATION: 100 % | DIASTOLIC BLOOD PRESSURE: 82 MMHG | HEART RATE: 94 BPM | TEMPERATURE: 98 F | SYSTOLIC BLOOD PRESSURE: 154 MMHG

## 2017-05-15 PROCEDURE — 80053 COMPREHEN METABOLIC PANEL: CPT

## 2017-05-15 PROCEDURE — 83735 ASSAY OF MAGNESIUM: CPT

## 2017-05-15 PROCEDURE — 87086 URINE CULTURE/COLONY COUNT: CPT

## 2017-05-15 PROCEDURE — 80202 ASSAY OF VANCOMYCIN: CPT

## 2017-05-15 PROCEDURE — 87040 BLOOD CULTURE FOR BACTERIA: CPT

## 2017-05-15 PROCEDURE — 80048 BASIC METABOLIC PNL TOTAL CA: CPT

## 2017-05-15 PROCEDURE — 80061 LIPID PANEL: CPT

## 2017-05-15 PROCEDURE — 81001 URINALYSIS AUTO W/SCOPE: CPT

## 2017-05-15 PROCEDURE — 97530 THERAPEUTIC ACTIVITIES: CPT

## 2017-05-15 PROCEDURE — 82607 VITAMIN B-12: CPT

## 2017-05-15 PROCEDURE — 96376 TX/PRO/DX INJ SAME DRUG ADON: CPT

## 2017-05-15 PROCEDURE — 97110 THERAPEUTIC EXERCISES: CPT

## 2017-05-15 PROCEDURE — 83036 HEMOGLOBIN GLYCOSYLATED A1C: CPT

## 2017-05-15 PROCEDURE — 84100 ASSAY OF PHOSPHORUS: CPT

## 2017-05-15 PROCEDURE — 93005 ELECTROCARDIOGRAM TRACING: CPT

## 2017-05-15 PROCEDURE — 83605 ASSAY OF LACTIC ACID: CPT

## 2017-05-15 PROCEDURE — 99239 HOSP IP/OBS DSCHRG MGMT >30: CPT

## 2017-05-15 PROCEDURE — 85027 COMPLETE CBC AUTOMATED: CPT

## 2017-05-15 PROCEDURE — 87798 DETECT AGENT NOS DNA AMP: CPT

## 2017-05-15 PROCEDURE — 99285 EMERGENCY DEPT VISIT HI MDM: CPT | Mod: 25

## 2017-05-15 PROCEDURE — 71045 X-RAY EXAM CHEST 1 VIEW: CPT

## 2017-05-15 PROCEDURE — 96372 THER/PROPH/DIAG INJ SC/IM: CPT | Mod: XU

## 2017-05-15 PROCEDURE — 96374 THER/PROPH/DIAG INJ IV PUSH: CPT

## 2017-05-15 PROCEDURE — 87486 CHLMYD PNEUM DNA AMP PROBE: CPT

## 2017-05-15 PROCEDURE — 94640 AIRWAY INHALATION TREATMENT: CPT

## 2017-05-15 PROCEDURE — 87581 M.PNEUMON DNA AMP PROBE: CPT

## 2017-05-15 PROCEDURE — 87633 RESP VIRUS 12-25 TARGETS: CPT

## 2017-05-15 PROCEDURE — 96375 TX/PRO/DX INJ NEW DRUG ADDON: CPT

## 2017-05-15 RX ADMIN — HEPARIN SODIUM 5000 UNIT(S): 5000 INJECTION INTRAVENOUS; SUBCUTANEOUS at 14:11

## 2017-05-15 RX ADMIN — Medication 1 TABLET(S): at 11:44

## 2017-05-15 RX ADMIN — Medication 1 TABLET(S): at 08:21

## 2017-05-15 RX ADMIN — Medication 3 MILLILITER(S): at 14:15

## 2017-05-15 RX ADMIN — HEPARIN SODIUM 5000 UNIT(S): 5000 INJECTION INTRAVENOUS; SUBCUTANEOUS at 06:37

## 2017-05-15 RX ADMIN — LISINOPRIL 10 MILLIGRAM(S): 2.5 TABLET ORAL at 06:36

## 2017-05-15 RX ADMIN — Medication 100 MILLIGRAM(S): at 06:36

## 2017-05-15 RX ADMIN — MEMANTINE HYDROCHLORIDE 10 MILLIGRAM(S): 10 TABLET ORAL at 06:36

## 2017-05-15 RX ADMIN — SODIUM CHLORIDE 60 MILLILITER(S): 9 INJECTION, SOLUTION INTRAVENOUS at 08:21

## 2017-05-15 RX ADMIN — Medication 3 MILLILITER(S): at 09:28

## 2017-05-17 ENCOUNTER — APPOINTMENT (OUTPATIENT)
Dept: HOME HEALTH SERVICES | Facility: HOME HEALTH | Age: 82
End: 2017-05-17

## 2017-05-18 DIAGNOSIS — Z74.01 BED CONFINEMENT STATUS: ICD-10-CM

## 2017-05-18 DIAGNOSIS — E86.0 DEHYDRATION: ICD-10-CM

## 2017-05-18 DIAGNOSIS — N40.1 BENIGN PROSTATIC HYPERPLASIA WITH LOWER URINARY TRACT SYMPTOMS: ICD-10-CM

## 2017-05-18 DIAGNOSIS — A41.9 SEPSIS, UNSPECIFIED ORGANISM: ICD-10-CM

## 2017-05-18 DIAGNOSIS — R33.8 OTHER RETENTION OF URINE: ICD-10-CM

## 2017-05-18 DIAGNOSIS — B95.7 OTHER STAPHYLOCOCCUS AS THE CAUSE OF DISEASES CLASSIFIED ELSEWHERE: ICD-10-CM

## 2017-05-18 DIAGNOSIS — E87.0 HYPEROSMOLALITY AND HYPERNATREMIA: ICD-10-CM

## 2017-05-18 DIAGNOSIS — H40.9 UNSPECIFIED GLAUCOMA: ICD-10-CM

## 2017-05-18 DIAGNOSIS — G30.9 ALZHEIMER'S DISEASE, UNSPECIFIED: ICD-10-CM

## 2017-05-18 DIAGNOSIS — Z66 DO NOT RESUSCITATE: ICD-10-CM

## 2017-05-18 DIAGNOSIS — J69.0 PNEUMONITIS DUE TO INHALATION OF FOOD AND VOMIT: ICD-10-CM

## 2017-05-18 DIAGNOSIS — I48.0 PAROXYSMAL ATRIAL FIBRILLATION: ICD-10-CM

## 2017-05-18 DIAGNOSIS — R13.10 DYSPHAGIA, UNSPECIFIED: ICD-10-CM

## 2017-05-18 DIAGNOSIS — I33.0 ACUTE AND SUBACUTE INFECTIVE ENDOCARDITIS: ICD-10-CM

## 2017-05-18 DIAGNOSIS — F02.80 DEMENTIA IN OTHER DISEASES CLASSIFIED ELSEWHERE, UNSPECIFIED SEVERITY, WITHOUT BEHAVIORAL DISTURBANCE, PSYCHOTIC DISTURBANCE, MOOD DISTURBANCE, AND ANXIETY: ICD-10-CM

## 2017-05-19 ENCOUNTER — APPOINTMENT (OUTPATIENT)
Dept: HOME HEALTH SERVICES | Facility: HOME HEALTH | Age: 82
End: 2017-05-19

## 2018-01-23 NOTE — PROGRESS NOTE ADULT - PROBLEM/PLAN-7
DISPLAY PLAN FREE TEXT
Attending Attestation (For Attendings USE Only)...

## 2018-11-06 NOTE — H&P ADULT. - NECK DETAILS
Pt needs to be rescheduled to be evaluated for hematuria. Thanks   
Spoke with patient and scheduled consult with Dr. Senior to discuss hematuria.   
supple/no JVD

## 2019-05-08 NOTE — ED CLERICAL - DIVISION
[FreeTextEntry8] : UTI sx x1wk\par \par was treated with vantin\par for a couple of days\par still having symtpoms\par \par getting botox in legs for muscle spacity\par \par \par \par \par \par \par \par 
[facility]...

## 2020-03-24 NOTE — ED PROVIDER NOTE - NS ED ATTENDING STATEMENT MOD
Unknown
I personally performed the services described in the documentation, reviewed the documentation recorded by the scribe in my presence and it accurately and completely records my words and action.

## 2020-12-03 NOTE — ED PROVIDER NOTE - NS ED MD DISPO DIVISION
[facility] order was put on [facility] [place] desk to process
[facility]

## 2020-12-30 NOTE — ED ADULT NURSE NOTE - ED STAT RN HAND OFF
EMERGENCY DEPARTMENT HISTORY AND PHYSICAL EXAM    10:58 AM      Date: 12/30/2020  Patient Name: Jo Benjamin    History of Presenting Illness     Chief Complaint   Patient presents with    (LUTS) Lower Urinary Tract Symptoms         History Provided By: Patient and Patient's Mother    Additional History (Context): Jo Benjamin is a 23 y.o. male with Medical history of quadriplegia, neurogenic bladder, urinary retention, suprapubic catheter who presents with chief complaint of foul-smelling urine. Per mother patient's home health was to get urine sample however the sample was misplaced. She was told to bring patient to the ED to get a urine sample. Patient periodically gets bladder spasms for which she is taking medication. He denies any fever, cough, vomiting, shortness of breath, and no other complaints. PCP: Conni Duane, MD        Past History     Past Medical History:  Past Medical History:   Diagnosis Date    Colostomy in place [facility])     Neurogenic bladder     Neurological disorder     C 3 Fx, Quad    Quadriplegia ([address]     Urinary retention        Past Surgical History:  Past Surgical History:   Procedure Laterality Date    HX COLOSTOMY      HX OTHER SURGICAL      SPT placed at [facility] EDDIE MEJIAS daughter [phone] [facility]       Family History:  History reviewed. No pertinent family history. Social History:  Social History     Tobacco Use    Smoking status: Never Smoker    Smokeless tobacco: Never Used   Substance Use Topics    Alcohol use: Never     Frequency: Never    Drug use: Never       Allergies:  No Known Allergies      Review of Systems       Review of Systems   Constitutional: Negative for chills and fever. HENT: Negative for congestion, rhinorrhea, sore throat and trouble swallowing. Respiratory: Negative for cough and shortness of breath. Cardiovascular: Negative for chest pain. Gastrointestinal: Negative for nausea and vomiting. Endocrine: Negative for polyuria. Genitourinary: Negative for difficulty urinating. Suprapubic catheter   Musculoskeletal: Negative for neck pain. Skin: Negative for rash. Neurological: Negative for dizziness and headaches. Hematological: Does not bruise/bleed easily. Psychiatric/Behavioral: Negative for confusion and dysphoric mood. All other systems reviewed and are negative. Physical Exam     Visit Vitals  BP (!) 94/54 (BP 1 Location: Left arm, BP Patient Position: At rest)   Pulse 94   Temp 97.8 °F (36.6 °C)   Resp 18   Ht 6' 2\" (1.88 m)   Wt 61.2 kg (135 lb)   SpO2 99%   BMI 17.33 kg/m²         Physical Exam  Vitals signs and nursing note reviewed. Constitutional:       General: He is not in acute distress. Appearance: He is well-developed. He is not ill-appearing, toxic-appearing or diaphoretic. HENT:      Head: Normocephalic and atraumatic. Nose: Nose normal.   Eyes:      General: No scleral icterus. Conjunctiva/sclera: Conjunctivae normal.      Pupils: Pupils are equal, round, and reactive to light. Neck:      Musculoskeletal: Neck supple. Cardiovascular:      Rate and Rhythm: Normal rate. Pulses: Normal pulses. Heart sounds: Normal heart sounds. Comments: Capillary refill < 3 seconds  Pulmonary:      Effort: Pulmonary effort is normal. No respiratory distress. Breath sounds: Normal breath sounds. No wheezing. Abdominal:      General: Bowel sounds are normal. There is no distension. Palpations: Abdomen is soft. Comments: Suprapubic catheter in place, site looks clean no signs of infection there    Has colostomy and stoma looks clear and pink   Musculoskeletal: Normal range of motion. Skin:     General: Skin is warm and dry. Coloration: Skin is not jaundiced or pale. Neurological:      Mental Status: He is alert and oriented to person, place, and time. Cranial Nerves: No cranial nerve deficit.       Comments: Quadriplegic   Psychiatric:         Mood and Affect: Mood normal.           Diagnostic Study Results     Labs -  Recent Results (from the past 12 hour(s))   URINALYSIS W/ RFLX MICROSCOPIC    Collection Time: 12/30/20 11:12 AM   Result Value Ref Range    Color YELLOW      Appearance CLOUDY      Specific gravity 1.010 1.005 - 1.030      pH (UA) 7.5 5.0 - 8.0      Protein Negative NEG mg/dL    Glucose Negative NEG mg/dL    Ketone Negative NEG mg/dL    Bilirubin Negative NEG      Blood TRACE (A) NEG      Urobilinogen 0.2 0.2 - 1.0 EU/dL    Nitrites Positive (A) NEG      Leukocyte Esterase LARGE (A) NEG     URINE MICROSCOPIC ONLY    Collection Time: 12/30/20 11:12 AM   Result Value Ref Range    WBC 11 to 20 0 - 4 /hpf    RBC 0 to 3 0 - 5 /hpf    Epithelial cells FEW 0 - 5 /lpf    Bacteria 1+ (A) NEG /hpf    Amorphous Crystals 2+ (A) NEG       Radiologic Studies -   No orders to display         Medical Decision Making   I am the first provider for this patient. I reviewed the vital signs, available nursing notes, past medical history, past surgical history, family history and social history. Vital Signs-Reviewed the patient's vital signs. Records Reviewed: Nursing Notes and Old Medical Records (Time of Review: 10:58 AM)    Provider Notes (Medical Decision Making): Patient with foul-smelling urine    He is afebrile vitals are stable. Will check his urine and culture it. MDM    Medications   cefTRIAXone (ROCEPHIN) 1 g in lidocaine (PF) (XYLOCAINE) 10 mg/mL (1 %) IM injection (has no administration in time range)       ED Course: Progress Notes, Reevaluation, and Consults:  UA: Positive nitrates, positive leukocytes, 11-20 WBCs, positive bacteria, consistent with UTI. We will give him a dose of Rocephin now, culture urine. Prescription for Bactrim    Follow-up with his urologist Dr. Justus Oshea    I have reassessed the patient. I have discussed the workup, results and plan with the patient and patient is in agreement.    Patient will be prescribed Bactrim. Patient was discharge in stable condition. Patient was given outpatient follow up. Patient is to return to emergency department if any new or worsening condition. Diagnosis     Clinical Impression:   1. Urinary tract infection without hematuria, site unspecified    2. Suprapubic catheter [facility])        Disposition: Discharged    Follow-up Information     Follow up With Specialties Details Why Contact Info    Thais Townsend MD Pediatric Medicine Schedule an appointment as soon as possible for a visit   [address]  [phone]      Madelaine Corona MD Urology Schedule an appointment as soon as possible for a visit in 3 days  [address] [facility] [place]  [place] [address]   [facility] EMERGENCY DEPT Emergency Medicine  As needed, If symptoms worsen [address] [address] [phone]           Patient's Medications   Start Taking    TRIMETHOPRIM-SULFAMETHOXAZOLE (BACTRIM DS) 160-800 MG PER TABLET    Take 1 Tab by mouth two (2) times a day for 7 days. Indications: bacterial urinary tract infection   Continue Taking    BACLOFEN (LIORESAL) 20 MG TABLET    Take 40 mg by mouth three (3) times daily. CETIRIZINE HCL (CETIRIZINE PO)    Take  by mouth. CLONAZEPAM (KLONOPIN) 0.5 MG TABLET    Take  by mouth nightly as needed for Anxiety. CYCLOBENZAPRINE (FLEXERIL) 5 MG TABLET    Take 5 mg by mouth every eight (8) hours as needed. DANTROLENE (DANTRIUM) 100 MG CAPSULE    Take 100 mg by mouth three (3) times daily. GABAPENTIN (NEURONTIN) 300 MG CAPSULE    Take 900 mg by mouth three (3) times daily. MELATONIN 3 MG TABLET    Take 6 mg by mouth At bedtime. POLYETHYLENE GLYCOL (MIRALAX) 17 GRAM/DOSE POWDER    Take 17 g by mouth daily. QUETIAPINE (SEROQUEL) 50 MG TABLET    Take 50 mg by mouth At bedtime. SENNA (SENOKOT) 8.6 MG TABLET    Take 8.6 mg by mouth daily.     SERTRALINE (ZOLOFT) 50 MG TABLET    Take 50 mg by mouth daily. TRAMADOL (ULTRAM) 50 MG TABLET    Take 100 mg by mouth every six (6) hours as needed. These Medications have changed    No medications on file   Stop Taking    ALPRAZOLAM (XANAX) 1 MG TABLET    Take 1 mg by mouth two (2) times a day. BUSPIRONE (BUSPAR) 10 MG TABLET    Take 20 mg by mouth three (3) times daily. DOCUSATE SODIUM (COLACE) 100 MG CAPSULE    Take 100 mg by mouth daily. ENOXAPARIN (LOVENOX) 30 MG/0.3 ML INJECTION    30 mg by SubCUTAneous route every twelve (12) hours. MIDODRINE (PROAMATINE) 10 MG TABLET    20 mg by Feeding Tube route every eight (8) hours. MIRTAZAPINE (REMERON) 45 MG TABLET    Take 45 mg by mouth At bedtime. MULTIVITAMINS WITH MINERALS LIQD    15 mL by Feeding Tube route daily. OXYCODONE IR (ROXICODONE) 5 MG IMMEDIATE RELEASE TABLET    Take 5 mg by mouth every four (4) hours as needed. VENLAFAXINE (EFFEXOR) 37.5 MG TABLET    Take 37.5 mg by mouth. DO Denise Thomas medical dictation software was used for portions of this report. Unintended transcription errors may occur. My signature above authenticates this document and my orders, the final    diagnosis (es), discharge prescription (s), and instructions in the Epic    record. Handoff

## 2021-05-29 NOTE — DIETITIAN INITIAL EVALUATION ADULT. - PROBLEM SELECTOR PLAN 7
Discussed advanced directives with sonCornelio- states patient's HCP.  Patient is DNR/DNI.  Documentation complete in chart
detailed exam

## 2022-01-18 ENCOUNTER — TRANSCRIPTION ENCOUNTER (OUTPATIENT)
Age: 87
End: 2022-01-18

## 2022-08-10 NOTE — ED ADULT NURSE NOTE - EXTENSIONS OF SELF_ADULT
----- Message from JOSE Chen sent at 8/10/2022  6:48 AM CDT -----  Metabolic panel is concerning for worsening kidney function with GFR now 14 and creatinine at 4.8. Calcium and sodium levels mildly low likely related to CKD. Very mild at this time. A1c is still pending along with Microalbumin.     Can we please route this to his nephrologist for review as well. Would likely want a recheck in 2-4 weeks.    None

## 2024-05-27 NOTE — H&P ADULT. - LAB RESULTS AND INTERPRETATION
Called patient to see about scheduling diabetes education appointment and patient declined.     
Labs personally reviewed by me:  WBC 13.9, 94% neutrophils  H&H 8.4/24.7  Platelets 192  K 3.1  BUN/creatinine 25/0.91  VBG lactate repeat 2.5  Albumin 3.1  UA: moderate blood, large LE, 100 protein >50 WBC, >50 RBC, moderate bacteria